# Patient Record
Sex: FEMALE | Race: WHITE | NOT HISPANIC OR LATINO | URBAN - METROPOLITAN AREA
[De-identification: names, ages, dates, MRNs, and addresses within clinical notes are randomized per-mention and may not be internally consistent; named-entity substitution may affect disease eponyms.]

---

## 2017-02-14 ENCOUNTER — EMERGENCY (EMERGENCY)
Facility: HOSPITAL | Age: 27
LOS: 1 days | End: 2017-02-14
Admitting: EMERGENCY MEDICINE
Payer: MEDICAID

## 2017-02-14 PROCEDURE — 99283 EMERGENCY DEPT VISIT LOW MDM: CPT | Mod: 25

## 2017-02-14 PROCEDURE — 71046 X-RAY EXAM CHEST 2 VIEWS: CPT

## 2017-02-14 PROCEDURE — 71020: CPT | Mod: 26

## 2017-02-14 PROCEDURE — 93005 ELECTROCARDIOGRAM TRACING: CPT

## 2017-02-14 PROCEDURE — 93010 ELECTROCARDIOGRAM REPORT: CPT

## 2017-10-05 ENCOUNTER — TRANSCRIPTION ENCOUNTER (OUTPATIENT)
Age: 27
End: 2017-10-05

## 2017-10-06 PROBLEM — Z00.00 ENCOUNTER FOR PREVENTIVE HEALTH EXAMINATION: Status: ACTIVE | Noted: 2017-10-06

## 2017-10-10 ENCOUNTER — EMERGENCY (EMERGENCY)
Facility: HOSPITAL | Age: 27
LOS: 0 days | Discharge: HOME | End: 2017-10-10
Attending: EMERGENCY MEDICINE | Admitting: EMERGENCY MEDICINE
Payer: MEDICAID

## 2017-10-10 VITALS
RESPIRATION RATE: 16 BRPM | HEIGHT: 62 IN | SYSTOLIC BLOOD PRESSURE: 118 MMHG | WEIGHT: 139.99 LBS | DIASTOLIC BLOOD PRESSURE: 72 MMHG | HEART RATE: 78 BPM | OXYGEN SATURATION: 97 % | TEMPERATURE: 99 F

## 2017-10-10 VITALS
TEMPERATURE: 98 F | DIASTOLIC BLOOD PRESSURE: 67 MMHG | OXYGEN SATURATION: 100 % | SYSTOLIC BLOOD PRESSURE: 101 MMHG | RESPIRATION RATE: 15 BRPM | HEART RATE: 78 BPM

## 2017-10-10 DIAGNOSIS — F10.120 ALCOHOL ABUSE WITH INTOXICATION, UNCOMPLICATED: ICD-10-CM
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
## 2017-10-10 LAB
ALBUMIN SERPL ELPH-MCNC: 4.1 G/DL — SIGNIFICANT CHANGE UP (ref 3.3–5)
ALP SERPL-CCNC: 98 U/L — SIGNIFICANT CHANGE UP (ref 40–120)
ALT FLD-CCNC: 27 U/L — SIGNIFICANT CHANGE UP (ref 12–78)
ANION GAP SERPL CALC-SCNC: 7 MMOL/L — SIGNIFICANT CHANGE UP (ref 5–17)
AST SERPL-CCNC: 21 U/L — SIGNIFICANT CHANGE UP (ref 15–37)
BASOPHILS # BLD AUTO: 0.1 K/UL — SIGNIFICANT CHANGE UP (ref 0–0.2)
BASOPHILS NFR BLD AUTO: 0.5 % — SIGNIFICANT CHANGE UP (ref 0–2)
BILIRUB SERPL-MCNC: 0.3 MG/DL — SIGNIFICANT CHANGE UP (ref 0.2–1.2)
BUN SERPL-MCNC: 6 MG/DL — LOW (ref 7–23)
CALCIUM SERPL-MCNC: 8.7 MG/DL — SIGNIFICANT CHANGE UP (ref 8.5–10.1)
CHLORIDE SERPL-SCNC: 109 MMOL/L — HIGH (ref 96–108)
CO2 SERPL-SCNC: 26 MMOL/L — SIGNIFICANT CHANGE UP (ref 22–31)
CREAT SERPL-MCNC: 0.58 MG/DL — SIGNIFICANT CHANGE UP (ref 0.5–1.3)
EOSINOPHIL # BLD AUTO: 0.1 K/UL — SIGNIFICANT CHANGE UP (ref 0–0.5)
EOSINOPHIL NFR BLD AUTO: 1.3 % — SIGNIFICANT CHANGE UP (ref 0–6)
ETHANOL SERPL-MCNC: 141 MG/DL — HIGH (ref 0–10)
GLUCOSE SERPL-MCNC: 84 MG/DL — SIGNIFICANT CHANGE UP (ref 70–99)
HCT VFR BLD CALC: 47.4 % — HIGH (ref 34.5–45)
HGB BLD-MCNC: 15.1 G/DL — SIGNIFICANT CHANGE UP (ref 11.5–15.5)
LYMPHOCYTES # BLD AUTO: 1.2 K/UL — SIGNIFICANT CHANGE UP (ref 1–3.3)
LYMPHOCYTES # BLD AUTO: 12.4 % — LOW (ref 13–44)
MCHC RBC-ENTMCNC: 24.2 PG — LOW (ref 27–34)
MCHC RBC-ENTMCNC: 31.8 GM/DL — LOW (ref 32–36)
MCV RBC AUTO: 75.8 FL — LOW (ref 80–100)
MONOCYTES # BLD AUTO: 0.5 K/UL — SIGNIFICANT CHANGE UP (ref 0–0.9)
MONOCYTES NFR BLD AUTO: 4.8 % — SIGNIFICANT CHANGE UP (ref 2–14)
NEUTROPHILS # BLD AUTO: 8 K/UL — HIGH (ref 1.8–7.4)
NEUTROPHILS NFR BLD AUTO: 81 % — HIGH (ref 43–77)
PLATELET # BLD AUTO: 246 K/UL — SIGNIFICANT CHANGE UP (ref 150–400)
POTASSIUM SERPL-MCNC: 3.8 MMOL/L — SIGNIFICANT CHANGE UP (ref 3.5–5.3)
POTASSIUM SERPL-SCNC: 3.8 MMOL/L — SIGNIFICANT CHANGE UP (ref 3.5–5.3)
PROT SERPL-MCNC: 8.1 GM/DL — SIGNIFICANT CHANGE UP (ref 6–8.3)
RBC # BLD: 6.25 M/UL — HIGH (ref 3.8–5.2)
RBC # FLD: 12.5 % — SIGNIFICANT CHANGE UP (ref 10.3–14.5)
SODIUM SERPL-SCNC: 142 MMOL/L — SIGNIFICANT CHANGE UP (ref 135–145)
WBC # BLD: 9.9 K/UL — SIGNIFICANT CHANGE UP (ref 3.8–10.5)
WBC # FLD AUTO: 9.9 K/UL — SIGNIFICANT CHANGE UP (ref 3.8–10.5)

## 2017-10-10 PROCEDURE — 99284 EMERGENCY DEPT VISIT MOD MDM: CPT | Mod: 25

## 2017-10-10 RX ORDER — ONDANSETRON 8 MG/1
4 TABLET, FILM COATED ORAL ONCE
Qty: 0 | Refills: 0 | Status: COMPLETED | OUTPATIENT
Start: 2017-10-10 | End: 2017-10-10

## 2017-10-10 RX ORDER — FAMOTIDINE 10 MG/ML
20 INJECTION INTRAVENOUS ONCE
Qty: 0 | Refills: 0 | Status: COMPLETED | OUTPATIENT
Start: 2017-10-10 | End: 2017-10-10

## 2017-10-10 RX ORDER — SODIUM CHLORIDE 9 MG/ML
1000 INJECTION INTRAMUSCULAR; INTRAVENOUS; SUBCUTANEOUS ONCE
Qty: 0 | Refills: 0 | Status: COMPLETED | OUTPATIENT
Start: 2017-10-10 | End: 2017-10-10

## 2017-10-10 RX ADMIN — SODIUM CHLORIDE 1000 MILLILITER(S): 9 INJECTION INTRAMUSCULAR; INTRAVENOUS; SUBCUTANEOUS at 05:30

## 2017-10-10 RX ADMIN — FAMOTIDINE 20 MILLIGRAM(S): 10 INJECTION INTRAVENOUS at 05:30

## 2017-10-10 RX ADMIN — ONDANSETRON 4 MILLIGRAM(S): 8 TABLET, FILM COATED ORAL at 05:30

## 2017-10-10 NOTE — ED ADULT NURSE NOTE - OBJECTIVE STATEMENT
pt BIBA c/o seeing small tinge of blood in vomit. pt reports drinking alcohol earlier. no other complaints.

## 2017-10-10 NOTE — ED PROVIDER NOTE - OBJECTIVE STATEMENT
25 y/o female with no PMHx presents to the ED BIBEMS c/o alcohol intoxication. +vomiting, prompting ED visit. Pt states she drank 3-4 of carol.

## 2017-10-10 NOTE — ED ADULT TRIAGE NOTE - CHIEF COMPLAINT QUOTE
patient was drinking heavily tonight and reports that she vomited and "may have seen blood." Denies any pain. No significant med hx per patient. patient appears intoxicated. AOB.

## 2017-10-16 ENCOUNTER — APPOINTMENT (OUTPATIENT)
Dept: ORTHOPEDIC SURGERY | Facility: CLINIC | Age: 27
End: 2017-10-16

## 2018-07-27 ENCOUNTER — EMERGENCY (EMERGENCY)
Facility: HOSPITAL | Age: 28
LOS: 1 days | Discharge: HOME | End: 2018-07-27
Attending: INTERNAL MEDICINE
Payer: COMMERCIAL

## 2018-07-27 VITALS
RESPIRATION RATE: 18 BRPM | TEMPERATURE: 98 F | SYSTOLIC BLOOD PRESSURE: 110 MMHG | OXYGEN SATURATION: 98 % | DIASTOLIC BLOOD PRESSURE: 65 MMHG | HEART RATE: 85 BPM

## 2018-07-27 VITALS
HEIGHT: 62 IN | WEIGHT: 138.89 LBS | DIASTOLIC BLOOD PRESSURE: 74 MMHG | TEMPERATURE: 98 F | RESPIRATION RATE: 14 BRPM | OXYGEN SATURATION: 98 % | SYSTOLIC BLOOD PRESSURE: 115 MMHG | HEART RATE: 133 BPM

## 2018-07-27 PROCEDURE — 70486 CT MAXILLOFACIAL W/O DYE: CPT

## 2018-07-27 PROCEDURE — 70486 CT MAXILLOFACIAL W/O DYE: CPT | Mod: 26

## 2018-07-27 PROCEDURE — 99285 EMERGENCY DEPT VISIT HI MDM: CPT

## 2018-07-27 PROCEDURE — 99284 EMERGENCY DEPT VISIT MOD MDM: CPT | Mod: 25

## 2018-07-27 RX ORDER — IBUPROFEN 200 MG
600 TABLET ORAL ONCE
Qty: 0 | Refills: 0 | Status: COMPLETED | OUTPATIENT
Start: 2018-07-27 | End: 2018-07-27

## 2018-07-27 RX ADMIN — Medication 600 MILLIGRAM(S): at 06:27
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
## 2018-07-27 NOTE — ED PROVIDER NOTE - ENMT, MLM
Airway patent, Nasal mucosa clear. Mouth with normal mucosa. Throat has no vesicles, no oropharyngeal exudates and uvula is midline. left facial malar, nasal swelling and tender

## 2018-09-27 ENCOUNTER — EMERGENCY (EMERGENCY)
Facility: HOSPITAL | Age: 28
LOS: 1 days | Discharge: HOME | End: 2018-09-27
Attending: EMERGENCY MEDICINE | Admitting: EMERGENCY MEDICINE
Payer: SELF-PAY

## 2018-09-27 VITALS
RESPIRATION RATE: 14 BRPM | DIASTOLIC BLOOD PRESSURE: 65 MMHG | TEMPERATURE: 98 F | OXYGEN SATURATION: 99 % | SYSTOLIC BLOOD PRESSURE: 129 MMHG | HEART RATE: 75 BPM

## 2018-09-27 VITALS
RESPIRATION RATE: 16 BRPM | HEART RATE: 76 BPM | WEIGHT: 149.91 LBS | HEIGHT: 62 IN | SYSTOLIC BLOOD PRESSURE: 140 MMHG | DIASTOLIC BLOOD PRESSURE: 70 MMHG | TEMPERATURE: 98 F | OXYGEN SATURATION: 98 %

## 2018-09-27 LAB
ALBUMIN SERPL ELPH-MCNC: 3.7 G/DL — SIGNIFICANT CHANGE UP (ref 3.3–5)
ALP SERPL-CCNC: 191 U/L — HIGH (ref 30–120)
ALT FLD-CCNC: 109 U/L DA — HIGH (ref 10–60)
ANION GAP SERPL CALC-SCNC: 9 MMOL/L — SIGNIFICANT CHANGE UP (ref 5–17)
AST SERPL-CCNC: 102 U/L — HIGH (ref 10–40)
BASOPHILS # BLD AUTO: 0.03 K/UL — SIGNIFICANT CHANGE UP (ref 0–0.2)
BASOPHILS NFR BLD AUTO: 0.5 % — SIGNIFICANT CHANGE UP (ref 0–2)
BILIRUB SERPL-MCNC: 0.4 MG/DL — SIGNIFICANT CHANGE UP (ref 0.2–1.2)
BUN SERPL-MCNC: 9 MG/DL — SIGNIFICANT CHANGE UP (ref 7–23)
CALCIUM SERPL-MCNC: 8.8 MG/DL — SIGNIFICANT CHANGE UP (ref 8.4–10.5)
CHLORIDE SERPL-SCNC: 108 MMOL/L — SIGNIFICANT CHANGE UP (ref 96–108)
CO2 SERPL-SCNC: 25 MMOL/L — SIGNIFICANT CHANGE UP (ref 22–31)
CREAT SERPL-MCNC: 0.63 MG/DL — SIGNIFICANT CHANGE UP (ref 0.5–1.3)
D DIMER BLD IA.RAPID-MCNC: <150 NG/ML DDU — SIGNIFICANT CHANGE UP
EOSINOPHIL # BLD AUTO: 0.27 K/UL — SIGNIFICANT CHANGE UP (ref 0–0.5)
EOSINOPHIL NFR BLD AUTO: 4.8 % — SIGNIFICANT CHANGE UP (ref 0–6)
GLUCOSE SERPL-MCNC: 98 MG/DL — SIGNIFICANT CHANGE UP (ref 70–99)
HCT VFR BLD CALC: 48.8 % — HIGH (ref 34.5–45)
HGB BLD-MCNC: 15.6 G/DL — HIGH (ref 11.5–15.5)
IMM GRANULOCYTES NFR BLD AUTO: 0.5 % — SIGNIFICANT CHANGE UP (ref 0–1.5)
LIDOCAIN IGE QN: 201 U/L — SIGNIFICANT CHANGE UP (ref 73–393)
LYMPHOCYTES # BLD AUTO: 1.92 K/UL — SIGNIFICANT CHANGE UP (ref 1–3.3)
LYMPHOCYTES # BLD AUTO: 34.2 % — SIGNIFICANT CHANGE UP (ref 13–44)
MCHC RBC-ENTMCNC: 24.8 PG — LOW (ref 27–34)
MCHC RBC-ENTMCNC: 32 GM/DL — SIGNIFICANT CHANGE UP (ref 32–36)
MCV RBC AUTO: 77.7 FL — LOW (ref 80–100)
MONOCYTES # BLD AUTO: 0.7 K/UL — SIGNIFICANT CHANGE UP (ref 0–0.9)
MONOCYTES NFR BLD AUTO: 12.5 % — SIGNIFICANT CHANGE UP (ref 2–14)
NEUTROPHILS # BLD AUTO: 2.66 K/UL — SIGNIFICANT CHANGE UP (ref 1.8–7.4)
NEUTROPHILS NFR BLD AUTO: 47.5 % — SIGNIFICANT CHANGE UP (ref 43–77)
NRBC # BLD: 0 /100 WBCS — SIGNIFICANT CHANGE UP (ref 0–0)
PLATELET # BLD AUTO: 219 K/UL — SIGNIFICANT CHANGE UP (ref 150–400)
POTASSIUM SERPL-MCNC: 4.2 MMOL/L — SIGNIFICANT CHANGE UP (ref 3.5–5.3)
POTASSIUM SERPL-SCNC: 4.2 MMOL/L — SIGNIFICANT CHANGE UP (ref 3.5–5.3)
PROT SERPL-MCNC: 7.5 G/DL — SIGNIFICANT CHANGE UP (ref 6–8.3)
RBC # BLD: 6.28 M/UL — HIGH (ref 3.8–5.2)
RBC # FLD: 14.1 % — SIGNIFICANT CHANGE UP (ref 10.3–14.5)
SODIUM SERPL-SCNC: 142 MMOL/L — SIGNIFICANT CHANGE UP (ref 135–145)
WBC # BLD: 5.61 K/UL — SIGNIFICANT CHANGE UP (ref 3.8–10.5)
WBC # FLD AUTO: 5.61 K/UL — SIGNIFICANT CHANGE UP (ref 3.8–10.5)

## 2018-09-27 PROCEDURE — 76700 US EXAM ABDOM COMPLETE: CPT | Mod: 26

## 2018-09-27 PROCEDURE — 87389 HIV-1 AG W/HIV-1&-2 AB AG IA: CPT

## 2018-09-27 PROCEDURE — 71046 X-RAY EXAM CHEST 2 VIEWS: CPT

## 2018-09-27 PROCEDURE — 99284 EMERGENCY DEPT VISIT MOD MDM: CPT | Mod: 25

## 2018-09-27 PROCEDURE — 83690 ASSAY OF LIPASE: CPT

## 2018-09-27 PROCEDURE — 93005 ELECTROCARDIOGRAM TRACING: CPT

## 2018-09-27 PROCEDURE — 93010 ELECTROCARDIOGRAM REPORT: CPT

## 2018-09-27 PROCEDURE — 99284 EMERGENCY DEPT VISIT MOD MDM: CPT

## 2018-09-27 PROCEDURE — 71046 X-RAY EXAM CHEST 2 VIEWS: CPT | Mod: 26

## 2018-09-27 PROCEDURE — 36415 COLL VENOUS BLD VENIPUNCTURE: CPT

## 2018-09-27 PROCEDURE — 76700 US EXAM ABDOM COMPLETE: CPT

## 2018-09-27 PROCEDURE — 85379 FIBRIN DEGRADATION QUANT: CPT

## 2018-09-27 PROCEDURE — 80053 COMPREHEN METABOLIC PANEL: CPT

## 2018-09-27 PROCEDURE — 85027 COMPLETE CBC AUTOMATED: CPT

## 2018-09-27 RX ORDER — FAMOTIDINE 10 MG/ML
20 INJECTION INTRAVENOUS ONCE
Qty: 0 | Refills: 0 | Status: DISCONTINUED | OUTPATIENT
Start: 2018-09-27 | End: 2018-10-01

## 2018-09-27 RX ORDER — FAMOTIDINE 10 MG/ML
20 INJECTION INTRAVENOUS ONCE
Qty: 0 | Refills: 0 | Status: DISCONTINUED | OUTPATIENT
Start: 2018-09-27 | End: 2018-09-27

## 2018-09-27 NOTE — ED ADULT NURSE NOTE - NSIMPLEMENTINTERV_GEN_ALL_ED
Implemented All Universal Safety Interventions:  [place] to call system. Call bell, personal items and telephone within reach. Instruct patient to call for assistance. Room bathroom lighting operational. Non-slip footwear when patient is off stretcher. Physically safe environment: no spills, clutter or unnecessary equipment. Stretcher in lowest position, wheels locked, appropriate side rails in place.

## 2018-09-27 NOTE — ED PROVIDER NOTE - OBJECTIVE STATEMENT
pt c/o 45 min of substernal chest pressure this am. no fevers, chills, ha, d/n/v, sob, cough, abd pain, edema, calf pain, travel. pain nonexertional, non pleuritic  pmd - marito

## 2018-09-29 LAB — HIV 1+2 AB+HIV1 P24 AG SERPL QL IA: SIGNIFICANT CHANGE UP

## 2019-02-27 ENCOUNTER — TRANSCRIPTION ENCOUNTER (OUTPATIENT)
Age: 29
End: 2019-02-27

## 2019-03-01 ENCOUNTER — OUTPATIENT (OUTPATIENT)
Dept: OUTPATIENT SERVICES | Facility: HOSPITAL | Age: 29
LOS: 1 days | End: 2019-03-01
Payer: MEDICAID

## 2019-03-01 PROCEDURE — G9001: CPT

## 2019-03-14 DIAGNOSIS — Z71.89 OTHER SPECIFIED COUNSELING: ICD-10-CM

## 2019-03-21 ENCOUNTER — EMERGENCY (EMERGENCY)
Facility: HOSPITAL | Age: 29
LOS: 1 days | Discharge: HOME | End: 2019-03-21
Attending: EMERGENCY MEDICINE | Admitting: EMERGENCY MEDICINE
Payer: MEDICAID

## 2019-03-21 VITALS
SYSTOLIC BLOOD PRESSURE: 107 MMHG | TEMPERATURE: 98 F | RESPIRATION RATE: 16 BRPM | DIASTOLIC BLOOD PRESSURE: 74 MMHG | HEART RATE: 88 BPM | OXYGEN SATURATION: 97 %

## 2019-03-21 VITALS
OXYGEN SATURATION: 98 % | SYSTOLIC BLOOD PRESSURE: 130 MMHG | HEART RATE: 118 BPM | RESPIRATION RATE: 20 BRPM | TEMPERATURE: 98 F | WEIGHT: 149.91 LBS | DIASTOLIC BLOOD PRESSURE: 76 MMHG | HEIGHT: 62 IN

## 2019-03-21 LAB
ALBUMIN SERPL ELPH-MCNC: 3.8 G/DL — SIGNIFICANT CHANGE UP (ref 3.3–5)
ALP SERPL-CCNC: 145 U/L — HIGH (ref 30–120)
ALT FLD-CCNC: 68 U/L DA — HIGH (ref 10–60)
ANION GAP SERPL CALC-SCNC: 9 MMOL/L — SIGNIFICANT CHANGE UP (ref 5–17)
APPEARANCE UR: CLEAR — SIGNIFICANT CHANGE UP
APTT BLD: 33.6 SEC — SIGNIFICANT CHANGE UP (ref 28.5–37)
AST SERPL-CCNC: 30 U/L — SIGNIFICANT CHANGE UP (ref 10–40)
BASOPHILS # BLD AUTO: 0.02 K/UL — SIGNIFICANT CHANGE UP (ref 0–0.2)
BASOPHILS NFR BLD AUTO: 0.4 % — SIGNIFICANT CHANGE UP (ref 0–2)
BILIRUB SERPL-MCNC: 0.3 MG/DL — SIGNIFICANT CHANGE UP (ref 0.2–1.2)
BILIRUB UR-MCNC: NEGATIVE — SIGNIFICANT CHANGE UP
BUN SERPL-MCNC: 8 MG/DL — SIGNIFICANT CHANGE UP (ref 7–23)
CALCIUM SERPL-MCNC: 9.2 MG/DL — SIGNIFICANT CHANGE UP (ref 8.4–10.5)
CHLORIDE SERPL-SCNC: 103 MMOL/L — SIGNIFICANT CHANGE UP (ref 96–108)
CO2 SERPL-SCNC: 28 MMOL/L — SIGNIFICANT CHANGE UP (ref 22–31)
COLOR SPEC: YELLOW — SIGNIFICANT CHANGE UP
CREAT SERPL-MCNC: 0.84 MG/DL — SIGNIFICANT CHANGE UP (ref 0.5–1.3)
D DIMER BLD IA.RAPID-MCNC: <150 NG/ML DDU — SIGNIFICANT CHANGE UP
DIFF PNL FLD: ABNORMAL
EOSINOPHIL # BLD AUTO: 0.11 K/UL — SIGNIFICANT CHANGE UP (ref 0–0.5)
EOSINOPHIL NFR BLD AUTO: 2.4 % — SIGNIFICANT CHANGE UP (ref 0–6)
FLU A RESULT: SIGNIFICANT CHANGE UP
FLU A RESULT: SIGNIFICANT CHANGE UP
FLUAV AG NPH QL: SIGNIFICANT CHANGE UP
FLUBV AG NPH QL: SIGNIFICANT CHANGE UP
GLUCOSE SERPL-MCNC: 127 MG/DL — HIGH (ref 70–99)
GLUCOSE UR QL: NEGATIVE MG/DL — SIGNIFICANT CHANGE UP
HCT VFR BLD CALC: 44 % — SIGNIFICANT CHANGE UP (ref 34.5–45)
HGB BLD-MCNC: 14.1 G/DL — SIGNIFICANT CHANGE UP (ref 11.5–15.5)
IMM GRANULOCYTES NFR BLD AUTO: 0.2 % — SIGNIFICANT CHANGE UP (ref 0–1.5)
INR BLD: 1.11 RATIO — SIGNIFICANT CHANGE UP (ref 0.88–1.16)
KETONES UR-MCNC: NEGATIVE — SIGNIFICANT CHANGE UP
LACTATE SERPL-SCNC: 1.7 MMOL/L — SIGNIFICANT CHANGE UP (ref 0.7–2)
LEUKOCYTE ESTERASE UR-ACNC: ABNORMAL
LYMPHOCYTES # BLD AUTO: 1.29 K/UL — SIGNIFICANT CHANGE UP (ref 1–3.3)
LYMPHOCYTES # BLD AUTO: 28 % — SIGNIFICANT CHANGE UP (ref 13–44)
MCHC RBC-ENTMCNC: 24.8 PG — LOW (ref 27–34)
MCHC RBC-ENTMCNC: 32 GM/DL — SIGNIFICANT CHANGE UP (ref 32–36)
MCV RBC AUTO: 77.5 FL — LOW (ref 80–100)
MONOCYTES # BLD AUTO: 0.59 K/UL — SIGNIFICANT CHANGE UP (ref 0–0.9)
MONOCYTES NFR BLD AUTO: 12.8 % — SIGNIFICANT CHANGE UP (ref 2–14)
NEUTROPHILS # BLD AUTO: 2.59 K/UL — SIGNIFICANT CHANGE UP (ref 1.8–7.4)
NEUTROPHILS NFR BLD AUTO: 56.2 % — SIGNIFICANT CHANGE UP (ref 43–77)
NITRITE UR-MCNC: NEGATIVE — SIGNIFICANT CHANGE UP
NRBC # BLD: 0 /100 WBCS — SIGNIFICANT CHANGE UP (ref 0–0)
PH UR: 8 — SIGNIFICANT CHANGE UP (ref 5–8)
PLATELET # BLD AUTO: 189 K/UL — SIGNIFICANT CHANGE UP (ref 150–400)
POTASSIUM SERPL-MCNC: 3.9 MMOL/L — SIGNIFICANT CHANGE UP (ref 3.5–5.3)
POTASSIUM SERPL-SCNC: 3.9 MMOL/L — SIGNIFICANT CHANGE UP (ref 3.5–5.3)
PROT SERPL-MCNC: 7.7 G/DL — SIGNIFICANT CHANGE UP (ref 6–8.3)
PROT UR-MCNC: 15 MG/DL
PROTHROM AB SERPL-ACNC: 12.1 SEC — SIGNIFICANT CHANGE UP (ref 10–12.9)
RBC # BLD: 5.68 M/UL — HIGH (ref 3.8–5.2)
RBC # FLD: 12.8 % — SIGNIFICANT CHANGE UP (ref 10.3–14.5)
RSV RESULT: SIGNIFICANT CHANGE UP
RSV RNA RESP QL NAA+PROBE: SIGNIFICANT CHANGE UP
SODIUM SERPL-SCNC: 140 MMOL/L — SIGNIFICANT CHANGE UP (ref 135–145)
SP GR SPEC: 1.01 — SIGNIFICANT CHANGE UP (ref 1.01–1.02)
UROBILINOGEN FLD QL: NEGATIVE MG/DL — SIGNIFICANT CHANGE UP
WBC # BLD: 4.61 K/UL — SIGNIFICANT CHANGE UP (ref 3.8–10.5)
WBC # FLD AUTO: 4.61 K/UL — SIGNIFICANT CHANGE UP (ref 3.8–10.5)

## 2019-03-21 PROCEDURE — 87086 URINE CULTURE/COLONY COUNT: CPT

## 2019-03-21 PROCEDURE — 93005 ELECTROCARDIOGRAM TRACING: CPT

## 2019-03-21 PROCEDURE — 80053 COMPREHEN METABOLIC PANEL: CPT

## 2019-03-21 PROCEDURE — 85379 FIBRIN DEGRADATION QUANT: CPT

## 2019-03-21 PROCEDURE — 85610 PROTHROMBIN TIME: CPT

## 2019-03-21 PROCEDURE — 93971 EXTREMITY STUDY: CPT | Mod: 26,RT

## 2019-03-21 PROCEDURE — 93010 ELECTROCARDIOGRAM REPORT: CPT

## 2019-03-21 PROCEDURE — 87631 RESP VIRUS 3-5 TARGETS: CPT

## 2019-03-21 PROCEDURE — 99284 EMERGENCY DEPT VISIT MOD MDM: CPT

## 2019-03-21 PROCEDURE — 93971 EXTREMITY STUDY: CPT

## 2019-03-21 PROCEDURE — 87040 BLOOD CULTURE FOR BACTERIA: CPT

## 2019-03-21 PROCEDURE — 36415 COLL VENOUS BLD VENIPUNCTURE: CPT

## 2019-03-21 PROCEDURE — 85730 THROMBOPLASTIN TIME PARTIAL: CPT

## 2019-03-21 PROCEDURE — 99284 EMERGENCY DEPT VISIT MOD MDM: CPT | Mod: 25

## 2019-03-21 PROCEDURE — 81001 URINALYSIS AUTO W/SCOPE: CPT

## 2019-03-21 PROCEDURE — 96361 HYDRATE IV INFUSION ADD-ON: CPT

## 2019-03-21 PROCEDURE — 96374 THER/PROPH/DIAG INJ IV PUSH: CPT

## 2019-03-21 PROCEDURE — 85027 COMPLETE CBC AUTOMATED: CPT

## 2019-03-21 PROCEDURE — 83605 ASSAY OF LACTIC ACID: CPT

## 2019-03-21 PROCEDURE — 71046 X-RAY EXAM CHEST 2 VIEWS: CPT

## 2019-03-21 PROCEDURE — 71046 X-RAY EXAM CHEST 2 VIEWS: CPT | Mod: 26

## 2019-03-21 RX ORDER — ACETAMINOPHEN 500 MG
650 TABLET ORAL ONCE
Qty: 0 | Refills: 0 | Status: COMPLETED | OUTPATIENT
Start: 2019-03-21 | End: 2019-03-21

## 2019-03-21 RX ORDER — KETOROLAC TROMETHAMINE 30 MG/ML
30 SYRINGE (ML) INJECTION ONCE
Qty: 0 | Refills: 0 | Status: DISCONTINUED | OUTPATIENT
Start: 2019-03-21 | End: 2019-03-21

## 2019-03-21 RX ORDER — SODIUM CHLORIDE 9 MG/ML
1000 INJECTION INTRAMUSCULAR; INTRAVENOUS; SUBCUTANEOUS ONCE
Qty: 0 | Refills: 0 | Status: COMPLETED | OUTPATIENT
Start: 2019-03-21 | End: 2019-03-21

## 2019-03-21 RX ADMIN — Medication 30 MILLIGRAM(S): at 16:30

## 2019-03-21 RX ADMIN — SODIUM CHLORIDE 1000 MILLILITER(S): 9 INJECTION INTRAMUSCULAR; INTRAVENOUS; SUBCUTANEOUS at 16:57

## 2019-03-21 RX ADMIN — Medication 30 MILLIGRAM(S): at 16:57

## 2019-03-21 RX ADMIN — Medication 650 MILLIGRAM(S): at 16:00

## 2019-03-21 RX ADMIN — Medication 650 MILLIGRAM(S): at 15:10

## 2019-03-21 RX ADMIN — SODIUM CHLORIDE 1000 MILLILITER(S): 9 INJECTION INTRAMUSCULAR; INTRAVENOUS; SUBCUTANEOUS at 15:10

## 2019-03-21 NOTE — ED ADULT NURSE NOTE - OBJECTIVE STATEMENT
Patient presents c/o fever x 2 days with cough. Patient recently started a new job in a  and feels she has been exposed to germs at work. Last night patient developed right upper chest pains radiating to her right shoulder and feels short of breath. Patient also c/o pain to right leg, knee, ankle and calf since 2 days ago. Patient presents c/o fever x 2 days as high as 102-103 with cough. Patient recently started a new job in a  and feels she has been exposed to germs at work. Last night patient developed right upper chest pains radiating to her right shoulder and feels short of breath. Patient also c/o pain to right leg, knee, ankle and calf since 2 days ago.

## 2019-03-21 NOTE — ED PROVIDER NOTE - PROGRESS NOTE DETAILS
Alex MAHARAJ for ED attending, Dr. Keller : 27 y/o female with fever, cough, chest pain, flu like sx since yesterday, works at [employer], around sick children, smokes cigarettes, denies pregnancy.  PE: afebrile, no distress, lungs clear, remainder unremarkable.  Plan for labs with flu swab, CXR, EKG. Reevaluated patient at bedside.  Patient feeling improved.  no resp distress. lungs clear. Discussed the results of all diagnostic testing in ED and copies of all reports given.   An opportunity to ask questions was given.  Discussed the importance of prompt, close medical follow-up.  Patient will return with any changes, concerns or persistent / worsening symptoms.  Understanding of all instructions verbalized.  recommend motrin over the counter, push fluids, flonase nasal spray and decongestant. will have close follow up with PCP

## 2019-03-21 NOTE — ED PROVIDER NOTE - CLINICAL SUMMARY MEDICAL DECISION MAKING FREE TEXT BOX
cough and fever 2 days. chest pain and BRIAN since last night. no distress on exam. mildly tachycardic. will order blood cultures, lactate, CXR, EKG, UA, d-dimer, venous doppler. will give IVF and oral tylenol.

## 2019-03-21 NOTE — ED PROVIDER NOTE - ENMT, MLM
Airway patent, no sinus tenderness to palpation. Mouth with normal mucosa. Throat has no vesicles, no oropharyngeal exudates and uvula is midline.

## 2019-03-21 NOTE — ED ADULT NURSE NOTE - NSIMPLEMENTINTERV_GEN_ALL_ED
Implemented All Universal Safety Interventions:  [place] to call system. Call bell, personal items and telephone within reach. Instruct patient to call for assistance. Room bathroom lighting operational. Non-slip footwear when patient is off stretcher. Physically safe environment: no spills, clutter or unnecessary equipment. Stretcher in lowest position, wheels locked, appropriate side rails in place.

## 2019-03-21 NOTE — ED PROVIDER NOTE - OBJECTIVE STATEMENT
otherwise healthy 28 year old female presents with cough, congestion, and fever that started 2 days ago. recently started working at [employer] last week and thought may have gotten sick from one of the kids. right sided chest pain and shortness of breath started last night. reports fever of 102-103 two nights ago. also reports subjective fever this am. has not taken anything for pain or symptoms. slight discomfort to right knee and calf. denies recent travels or prolonged immobilizations. denies history of previous DVT or PE. no history of cancer  PCP Radha Sears

## 2019-03-21 NOTE — ED PROVIDER NOTE - LAB INTERPRETATION
FLU A B RSV Detection by PCR (03.21.19 @ 15:26)    Flu A Result: NotDetec: The Flu A B RSV assay is a Real-Time PCR test for the qualitative  detection and differentiation of Influenza A, Influenza B, and  Respiratory Syncytial Virus on nasopharyngeal swabs. The results should  be interpreted in the context of all clinical and laboratory findings.    Flu B Result: NotDetec    RSV Result: NotDetec

## 2019-03-22 LAB
CULTURE RESULTS: SIGNIFICANT CHANGE UP
SPECIMEN SOURCE: SIGNIFICANT CHANGE UP

## 2019-03-26 LAB
CULTURE RESULTS: SIGNIFICANT CHANGE UP
CULTURE RESULTS: SIGNIFICANT CHANGE UP
SPECIMEN SOURCE: SIGNIFICANT CHANGE UP
SPECIMEN SOURCE: SIGNIFICANT CHANGE UP

## 2019-04-11 ENCOUNTER — RESULT REVIEW (OUTPATIENT)
Age: 29
End: 2019-04-11

## 2021-03-18 ENCOUNTER — RESULT REVIEW (OUTPATIENT)
Age: 31
End: 2021-03-18

## 2021-12-22 ENCOUNTER — EMERGENCY (EMERGENCY)
Facility: HOSPITAL | Age: 31
LOS: 1 days | Discharge: HOME | End: 2021-12-22
Attending: EMERGENCY MEDICINE | Admitting: EMERGENCY MEDICINE
Payer: MEDICAID

## 2021-12-22 VITALS
SYSTOLIC BLOOD PRESSURE: 130 MMHG | DIASTOLIC BLOOD PRESSURE: 82 MMHG | OXYGEN SATURATION: 100 % | HEIGHT: 62 IN | HEART RATE: 82 BPM | RESPIRATION RATE: 14 BRPM | TEMPERATURE: 98 F | WEIGHT: 166.89 LBS

## 2021-12-22 LAB — SARS-COV-2 RNA SPEC QL NAA+PROBE: DETECTED

## 2021-12-22 PROCEDURE — 87635 SARS-COV-2 COVID-19 AMP PRB: CPT

## 2021-12-22 PROCEDURE — 99283 EMERGENCY DEPT VISIT LOW MDM: CPT

## 2021-12-22 PROCEDURE — 99284 EMERGENCY DEPT VISIT MOD MDM: CPT

## 2021-12-22 RX ORDER — ACETAMINOPHEN 500 MG
650 TABLET ORAL ONCE
Refills: 0 | Status: COMPLETED | OUTPATIENT
Start: 2021-12-22 | End: 2021-12-22

## 2021-12-22 RX ADMIN — Medication 650 MILLIGRAM(S): at 18:30

## 2021-12-22 NOTE — ED PROVIDER NOTE - OBJECTIVE STATEMENT
31 y/ F w/ no pertinent PMHx presents to ED c/o 2 days of body aches, and mild SOB. Pt reports 17 co-worker's have tested positive for COVID over the past week. Pt declining XR only wants swab and Tylenol for her symptoms. Pt relates she has cough but it is the same as chronic smokers cough. PMD: none. 31 y/ F w/ no pertinent PMHx presents to ED c/o 2 days of body aches, and mild SOB. Pt reports 17 co-worker's have tested positive for COVID over the past week. Pt declining XR only wants swab and Tylenol for her symptoms. Pt relates she has cough but it is the same as chronic smokers cough. no fevers, chills, abd pain, cp, d/n/v, diarrhea, urinary complaints. PMD: none. 31 y/ F w/ no pertinent PMHx presents to ED c/o 2 days of body aches, and mild SOB. Pt reports 17 co-worker's have tested positive for COVID over the past week. Pt declining XR only wants swab and Tylenol for her symptoms. Pt relates she has cough but it is the same as chronic smokers cough. + loss of taste. no fevers, chills, abd pain, cp, d/n/v, diarrhea, urinary complaints. PMD: none. 31 y/ F w/ no pertinent PMHx presents to ED c/o 2 days of body aches, and mild SOB. Pt reports 17 co-worker's have tested positive for COVID over the past week. Pt declining labs or XR only wants swab and Tylenol for her symptoms. Pt relates she has cough but it is the same as chronic smokers cough. + loss of taste. no fevers, chills, abd pain, cp, d/n/v, diarrhea, urinary complaints. PMD: none.

## 2021-12-22 NOTE — ED PROVIDER NOTE - CARE PROVIDER_API CALL
Rachel Noyola)  Internal Medicine  [address] [place] [address]  Phone: [phone]  Fax: [phone]  Follow Up Time: 1-3 Days

## 2021-12-22 NOTE — ED PROVIDER NOTE - PATIENT PORTAL LINK FT
You can access the FollowMyHealth Patient Portal offered by [facility] by registering at the following website: [email or web address]. By joining [employer]’s FollowMyHealth portal, you will also be able to view your health information using other applications (apps) compatible with our system. No. JOSH screening performed.  STOP BANG Legend: 0-2 = LOW Risk; 3-4 = INTERMEDIATE Risk; 5-8 = HIGH Risk

## 2022-03-29 NOTE — ED ADULT TRIAGE NOTE - CADM TRG TX PRIOR TO ARRIVAL
Appt scheduled for May 6 start time 9:30 AM, left all information on voicemail, mailed appt slip to address on file.     ----- Message from Yovana Alonzo RN sent at 3/28/2022  4:17 PM CDT -----  Contact: [phone]    ----- Message -----  From: Shelby Wright  Sent: 3/28/2022   2:49 PM CDT  To: Jai CALI Staff      Patient is returning a phone call.- Mom-    Who left a message for the patient:-  Yovana Alonzo RN-      Does patient know what this is regarding:- Scheduling a appointment-    Would you like a call back, or a response through your [facility] portal?:- Call back-    Comments:  Please call mom back to advise.         none

## 2022-06-22 ENCOUNTER — EMERGENCY (EMERGENCY)
Facility: HOSPITAL | Age: 32
LOS: 1 days | Discharge: HOME | End: 2022-06-22
Attending: EMERGENCY MEDICINE | Admitting: EMERGENCY MEDICINE
Payer: MEDICAID

## 2022-06-22 VITALS
DIASTOLIC BLOOD PRESSURE: 64 MMHG | HEART RATE: 87 BPM | TEMPERATURE: 98 F | RESPIRATION RATE: 19 BRPM | SYSTOLIC BLOOD PRESSURE: 130 MMHG | OXYGEN SATURATION: 100 %

## 2022-06-22 VITALS
OXYGEN SATURATION: 97 % | HEIGHT: 62 IN | TEMPERATURE: 98 F | DIASTOLIC BLOOD PRESSURE: 89 MMHG | SYSTOLIC BLOOD PRESSURE: 130 MMHG | WEIGHT: 171.96 LBS | HEART RATE: 85 BPM | RESPIRATION RATE: 24 BRPM

## 2022-06-22 LAB
ALBUMIN SERPL ELPH-MCNC: 4.2 G/DL — SIGNIFICANT CHANGE UP (ref 3.3–5)
ALP SERPL-CCNC: 152 U/L — HIGH (ref 30–120)
ALT FLD-CCNC: 76 U/L DA — HIGH (ref 10–60)
ANION GAP SERPL CALC-SCNC: 7 MMOL/L — SIGNIFICANT CHANGE UP (ref 5–17)
AST SERPL-CCNC: 50 U/L — HIGH (ref 10–40)
BASOPHILS # BLD AUTO: 0.02 K/UL — SIGNIFICANT CHANGE UP (ref 0–0.2)
BASOPHILS NFR BLD AUTO: 0.5 % — SIGNIFICANT CHANGE UP (ref 0–2)
BILIRUB SERPL-MCNC: 0.3 MG/DL — SIGNIFICANT CHANGE UP (ref 0.2–1.2)
BUN SERPL-MCNC: 5 MG/DL — LOW (ref 7–23)
CALCIUM SERPL-MCNC: 9.4 MG/DL — SIGNIFICANT CHANGE UP (ref 8.4–10.5)
CHLORIDE SERPL-SCNC: 101 MMOL/L — SIGNIFICANT CHANGE UP (ref 96–108)
CO2 SERPL-SCNC: 28 MMOL/L — SIGNIFICANT CHANGE UP (ref 22–31)
CREAT SERPL-MCNC: 0.59 MG/DL — SIGNIFICANT CHANGE UP (ref 0.5–1.3)
D DIMER BLD IA.RAPID-MCNC: <150 NG/ML DDU — SIGNIFICANT CHANGE UP
EGFR: 125 ML/MIN/1.73M2 — SIGNIFICANT CHANGE UP
EOSINOPHIL # BLD AUTO: 0.17 K/UL — SIGNIFICANT CHANGE UP (ref 0–0.5)
EOSINOPHIL NFR BLD AUTO: 4.1 % — SIGNIFICANT CHANGE UP (ref 0–6)
FLUAV AG NPH QL: SIGNIFICANT CHANGE UP
FLUBV AG NPH QL: SIGNIFICANT CHANGE UP
GLUCOSE SERPL-MCNC: 98 MG/DL — SIGNIFICANT CHANGE UP (ref 70–99)
HCG SERPL-ACNC: 1 MIU/ML — SIGNIFICANT CHANGE UP
HCT VFR BLD CALC: 42 % — SIGNIFICANT CHANGE UP (ref 34.5–45)
HGB BLD-MCNC: 13.5 G/DL — SIGNIFICANT CHANGE UP (ref 11.5–15.5)
IMM GRANULOCYTES NFR BLD AUTO: 0.2 % — SIGNIFICANT CHANGE UP (ref 0–1.5)
LIDOCAIN IGE QN: 96 U/L — SIGNIFICANT CHANGE UP (ref 73–393)
LYMPHOCYTES # BLD AUTO: 1.57 K/UL — SIGNIFICANT CHANGE UP (ref 1–3.3)
LYMPHOCYTES # BLD AUTO: 38 % — SIGNIFICANT CHANGE UP (ref 13–44)
MAGNESIUM SERPL-MCNC: 2.1 MG/DL — SIGNIFICANT CHANGE UP (ref 1.6–2.6)
MCHC RBC-ENTMCNC: 24.3 PG — LOW (ref 27–34)
MCHC RBC-ENTMCNC: 32.1 GM/DL — SIGNIFICANT CHANGE UP (ref 32–36)
MCV RBC AUTO: 75.7 FL — LOW (ref 80–100)
MONOCYTES # BLD AUTO: 0.42 K/UL — SIGNIFICANT CHANGE UP (ref 0–0.9)
MONOCYTES NFR BLD AUTO: 10.2 % — SIGNIFICANT CHANGE UP (ref 2–14)
NEUTROPHILS # BLD AUTO: 1.94 K/UL — SIGNIFICANT CHANGE UP (ref 1.8–7.4)
NEUTROPHILS NFR BLD AUTO: 47 % — SIGNIFICANT CHANGE UP (ref 43–77)
NRBC # BLD: 0 /100 WBCS — SIGNIFICANT CHANGE UP (ref 0–0)
PLATELET # BLD AUTO: 222 K/UL — SIGNIFICANT CHANGE UP (ref 150–400)
POTASSIUM SERPL-MCNC: 3.7 MMOL/L — SIGNIFICANT CHANGE UP (ref 3.5–5.3)
POTASSIUM SERPL-SCNC: 3.7 MMOL/L — SIGNIFICANT CHANGE UP (ref 3.5–5.3)
PROT SERPL-MCNC: 8.4 G/DL — HIGH (ref 6–8.3)
RBC # BLD: 5.55 M/UL — HIGH (ref 3.8–5.2)
RBC # FLD: 13.1 % — SIGNIFICANT CHANGE UP (ref 10.3–14.5)
RSV RNA NPH QL NAA+NON-PROBE: SIGNIFICANT CHANGE UP
SARS-COV-2 RNA SPEC QL NAA+PROBE: SIGNIFICANT CHANGE UP
SODIUM SERPL-SCNC: 136 MMOL/L — SIGNIFICANT CHANGE UP (ref 135–145)
TROPONIN I, HIGH SENSITIVITY RESULT: <4 NG/L — SIGNIFICANT CHANGE UP
WBC # BLD: 4.13 K/UL — SIGNIFICANT CHANGE UP (ref 3.8–10.5)
WBC # FLD AUTO: 4.13 K/UL — SIGNIFICANT CHANGE UP (ref 3.8–10.5)

## 2022-06-22 PROCEDURE — 85379 FIBRIN DEGRADATION QUANT: CPT

## 2022-06-22 PROCEDURE — 80053 COMPREHEN METABOLIC PANEL: CPT

## 2022-06-22 PROCEDURE — 84702 CHORIONIC GONADOTROPIN TEST: CPT

## 2022-06-22 PROCEDURE — 87637 SARSCOV2&INF A&B&RSV AMP PRB: CPT

## 2022-06-22 PROCEDURE — 99285 EMERGENCY DEPT VISIT HI MDM: CPT | Mod: 25

## 2022-06-22 PROCEDURE — 99285 EMERGENCY DEPT VISIT HI MDM: CPT

## 2022-06-22 PROCEDURE — 36415 COLL VENOUS BLD VENIPUNCTURE: CPT

## 2022-06-22 PROCEDURE — 70450 CT HEAD/BRAIN W/O DYE: CPT | Mod: 26,MA

## 2022-06-22 PROCEDURE — 93005 ELECTROCARDIOGRAM TRACING: CPT

## 2022-06-22 PROCEDURE — 71046 X-RAY EXAM CHEST 2 VIEWS: CPT

## 2022-06-22 PROCEDURE — 83690 ASSAY OF LIPASE: CPT

## 2022-06-22 PROCEDURE — 70450 CT HEAD/BRAIN W/O DYE: CPT | Mod: MA

## 2022-06-22 PROCEDURE — 84484 ASSAY OF TROPONIN QUANT: CPT

## 2022-06-22 PROCEDURE — 85025 COMPLETE CBC W/AUTO DIFF WBC: CPT

## 2022-06-22 PROCEDURE — 82962 GLUCOSE BLOOD TEST: CPT

## 2022-06-22 PROCEDURE — 93010 ELECTROCARDIOGRAM REPORT: CPT

## 2022-06-22 PROCEDURE — 71046 X-RAY EXAM CHEST 2 VIEWS: CPT | Mod: 26

## 2022-06-22 PROCEDURE — 83735 ASSAY OF MAGNESIUM: CPT

## 2022-06-22 PROCEDURE — 96360 HYDRATION IV INFUSION INIT: CPT

## 2022-06-22 RX ORDER — MECLIZINE HCL 12.5 MG
1 TABLET ORAL
Qty: 12 | Refills: 0
Start: 2022-06-22 | End: 2022-06-25

## 2022-06-22 RX ORDER — SODIUM CHLORIDE 9 MG/ML
1000 INJECTION INTRAMUSCULAR; INTRAVENOUS; SUBCUTANEOUS ONCE
Refills: 0 | Status: COMPLETED | OUTPATIENT
Start: 2022-06-22 | End: 2022-06-22

## 2022-06-22 RX ORDER — ACETAMINOPHEN 500 MG
975 TABLET ORAL ONCE
Refills: 0 | Status: COMPLETED | OUTPATIENT
Start: 2022-06-22 | End: 2022-06-22

## 2022-06-22 RX ORDER — MECLIZINE HCL 12.5 MG
25 TABLET ORAL ONCE
Refills: 0 | Status: COMPLETED | OUTPATIENT
Start: 2022-06-22 | End: 2022-06-22

## 2022-06-22 RX ADMIN — Medication 25 MILLIGRAM(S): at 17:26

## 2022-06-22 RX ADMIN — SODIUM CHLORIDE 1000 MILLILITER(S): 9 INJECTION INTRAMUSCULAR; INTRAVENOUS; SUBCUTANEOUS at 16:03

## 2022-06-22 RX ADMIN — Medication 975 MILLIGRAM(S): at 17:26

## 2022-06-22 RX ADMIN — Medication 975 MILLIGRAM(S): at 18:15

## 2022-06-22 RX ADMIN — SODIUM CHLORIDE 1000 MILLILITER(S): 9 INJECTION INTRAMUSCULAR; INTRAVENOUS; SUBCUTANEOUS at 17:03

## 2022-06-22 NOTE — ED PROVIDER NOTE - CARDIOVASCULAR [+], MLM
CHEST PAIN Split-Thickness Skin Graft Text: The defect edges were debeveled with a #15 scalpel blade.  Given the location of the defect, shape of the defect and the proximity to free margins a split thickness skin graft was deemed most appropriate.  Using a sterile surgical marker, the primary defect shape was transferred to the donor site. The split thickness graft was then harvested.  The skin graft was then placed in the primary defect and oriented appropriately.

## 2022-06-22 NOTE — ED PROVIDER NOTE - CARE PROVIDER_API CALL
Donnell Elam  Neurology  [address], [address]  [place] [address]  Phone: [phone]  Fax: [phone]  Follow Up Time:

## 2022-06-22 NOTE — ED ADULT NURSE NOTE - OBJECTIVE STATEMENT
31 YOF A&OX3 with pmh of DM (on metformin) presents to ED for dizziness. pt states developed left sided numbness, nausea and vomiting for 1 hr. pt states recently had episodes of fever 3 days ago that subsided. pt placed on cardiac monitor and EKG completed in ED. pt also complains of chest discomfort rated 7/10. pt denies sob, n/v/d, headaches. safety maintained.

## 2022-06-22 NOTE — ED PROVIDER NOTE - PATIENT PORTAL LINK FT
You can access the FollowMyHealth Patient Portal offered by [facility] by registering at the following website: [email or web address]. By joining [employer]’s FollowMyHealth portal, you will also be able to view your health information using other applications (apps) compatible with our system.

## 2022-06-22 NOTE — ED PROVIDER NOTE - CLINICAL SUMMARY MEDICAL DECISION MAKING FREE TEXT BOX
32 y/o female recently diagnosed with diabetes by her OBGYN on Metformin presents to the ED c/o intermittent dizziness, nausea, and left arm numbness radiating down to toes x today while at work. Pt also reports fevers and cough x few days but tested negative for COVID-19 yesterday. No vomiting. no headache. No cp/sob/palp. No ting/focal weak. No sick contacts. No other acute co. Pt is vaccinated for COVID-19. 30 y/o female recently diagnosed with diabetes by her OBGYN on Metformin presents to the ED c/o intermittent dizziness - states describes as spinning sensation, worse with movements of head. some nausea today as well. Pt states had some tingling in her L arm and L leg today while at work. no numbness /tingling to face. no numbness / focal weakness. No fall / recent trauma. No HA. Pt also reports fevers and cough x few days but tested negative for COVID-19 yesterday. No vomiting. no headache. No cp/sob/palp. No ting/focal weak. No sick contacts. No other acute co. Pt is vaccinated for COVID-19.  exam: MM Moist. neck supple, no meningeal signs. cta bl , no w/r/r. no acc muscle use. abd soft NT. no hsm. no cvat. nl non-focal detailed neuro exam. Nl non-focal detailed neuro exam. no c/c/e. no other acute findings.  check labs, xr, dimer, CE, CT head, meds for new onset vertigo - outpt neuro

## 2022-06-22 NOTE — ED PROVIDER NOTE - OBJECTIVE STATEMENT
32 y/o F newly-diagnosed DM (~3 weeks ago by SHUKRI, on metformin) c/o intermittent dizziness, nausea, left sided numbness radiating down to fingers and toes, while at work today. A few days ago had fever. Admits to cough. Had recent covid test that was negative. States had some cp and sob x few days.

## 2022-06-22 NOTE — ED ADULT NURSE NOTE - NS ED NURSE IV DC DT
Final Anesthesia Post-op Assessment    Patient: Jenni Junior  Procedure(s) Performed: EXTRACTION, TOOTH - BILATERAL  Anesthesia type: General    Vitals Value Taken Time   Temp 36.2 °C (97.2 °F) 05/12/22 1230   Pulse 100 05/12/22 1230   Resp 20 05/12/22 1230   SpO2 100 % 05/12/22 1230   /74 05/12/22 1230         Patient Location: PACU Phase 2  Post-op Vital Signs:stable  Level of Consciousness: participates in exam, awake, oriented, answers questions appropriately and alert  Respiratory Status: spontaneous ventilation  Cardiovascular blood pressure returned to baseline  Hydration: euvolemic  Pain Management: well controlled  Handoff: Handoff to receiving nurse was performed and questions were answered  Nausea: None  Airway Patency:patent  Post-op Assessment: awake, alert, appropriately conversant, or baseline and no complications      No complications documented.    22-Jun-2022 18:35

## 2022-06-22 NOTE — ED PROVIDER NOTE - NSFOLLOWUPINSTRUCTIONS_ED_ALL_ED_FT
Vertigo is the feeling that you or the things around you are moving when they are not. This feeling can come and go at any time. Vertigo often goes away on its own. This condition can be dangerous if it happens when you are doing activities like driving or working with machines.    Your doctor will do tests to find the cause of your vertigo. These tests will also help your doctor decide on the best treatment for you.      Follow these instructions at home:      Eating and drinking       A comparison of three sample cups showing dark yellow, yellow, and pale yellow urine.       A sign showing that a person should not drink beer, wine, or liquor.     •Drink enough fluid to keep your pee (urine) pale yellow.      • Do not drink alcohol.      Activity     •Return to your normal activities when your doctor says that it is safe.      •In the morning, first sit up on the side of the bed. When you feel okay, stand slowly while you hold onto something until you know that your balance is fine.      •Move slowly. Avoid sudden body or head movements or certain positions, as told by your doctor.      •Use a cane if you have trouble standing or walking.      •Sit down right away if you feel dizzy.      •Avoid doing any tasks or activities that can cause danger to you or others if you get dizzy.      •Avoid bending down if you feel dizzy. Place items in your home so that they are easy for you to reach without bending or leaning over.      • Do not drive or use machinery if you feel dizzy.      General instructions     •Take over-the-counter and prescription medicines only as told by your doctor.      •Keep all follow-up visits.        Contact a doctor if:    •Your medicine does not help your vertigo.      •Your problems get worse or you have new symptoms.      •You have a fever.      •You feel like you may vomit (nauseous), or this feeling gets worse.      •You start to vomit.      •Your family or friends see changes in how you act.      •You lose feeling (have numbness) in part of your body.      •You feel prickling and tingling in a part of your body.        Get help right away if:    •You are always dizzy.      •You faint.      •You get very bad headaches.      •You get a stiff neck.      •Bright light starts to bother you.      •You have trouble moving or talking.      •You feel weak in your hands, arms, or legs.      •You have changes in your hearing or in how you see (vision).      These symptoms may be an emergency. Get help right away. Call your local emergency services (911 in the [place]   • Do not wait to see if the symptoms will go away.        • Do not drive yourself to the hospital.         Summary    •Vertigo is the feeling that you or the things around you are moving when they are not.      •Your doctor will do tests to find the cause of your vertigo.      •You may be told to avoid some tasks, positions, or movements.      •Contact a doctor if your medicine is not helping, or if you have a fever, new symptoms, or a change in how you act.      •Get help right away if you get very bad headaches, or if you have changes in how you speak, hear, or see.      This information is not intended to replace advice given to you by your health care provider. Make sure you discuss any questions you have with your health care provider.

## 2022-10-20 ENCOUNTER — EMERGENCY (EMERGENCY)
Facility: HOSPITAL | Age: 32
LOS: 1 days | Discharge: HOME | End: 2022-10-20
Attending: EMERGENCY MEDICINE | Admitting: EMERGENCY MEDICINE
Payer: MEDICAID

## 2022-10-20 VITALS
DIASTOLIC BLOOD PRESSURE: 78 MMHG | OXYGEN SATURATION: 100 % | HEART RATE: 62 BPM | RESPIRATION RATE: 18 BRPM | TEMPERATURE: 99 F | WEIGHT: 179.9 LBS | SYSTOLIC BLOOD PRESSURE: 110 MMHG | HEIGHT: 62 IN

## 2022-10-20 LAB
ALBUMIN SERPL ELPH-MCNC: 3.6 G/DL — SIGNIFICANT CHANGE UP (ref 3.3–5)
ALP SERPL-CCNC: 90 U/L — SIGNIFICANT CHANGE UP (ref 40–120)
ALT FLD-CCNC: 39 U/L — SIGNIFICANT CHANGE UP (ref 12–78)
ANION GAP SERPL CALC-SCNC: 6 MMOL/L — SIGNIFICANT CHANGE UP (ref 5–17)
APPEARANCE UR: ABNORMAL
AST SERPL-CCNC: 26 U/L — SIGNIFICANT CHANGE UP (ref 15–37)
BASOPHILS # BLD AUTO: 0.02 K/UL — SIGNIFICANT CHANGE UP (ref 0–0.2)
BASOPHILS NFR BLD AUTO: 0.4 % — SIGNIFICANT CHANGE UP (ref 0–2)
BILIRUB SERPL-MCNC: 0.6 MG/DL — SIGNIFICANT CHANGE UP (ref 0.2–1.2)
BILIRUB UR-MCNC: NEGATIVE — SIGNIFICANT CHANGE UP
BUN SERPL-MCNC: 9 MG/DL — SIGNIFICANT CHANGE UP (ref 7–23)
CALCIUM SERPL-MCNC: 8.6 MG/DL — SIGNIFICANT CHANGE UP (ref 8.5–10.1)
CHLORIDE SERPL-SCNC: 102 MMOL/L — SIGNIFICANT CHANGE UP (ref 96–108)
CO2 SERPL-SCNC: 30 MMOL/L — SIGNIFICANT CHANGE UP (ref 22–31)
COLOR SPEC: YELLOW — SIGNIFICANT CHANGE UP
CREAT SERPL-MCNC: 0.63 MG/DL — SIGNIFICANT CHANGE UP (ref 0.5–1.3)
DIFF PNL FLD: ABNORMAL
EGFR: 123 ML/MIN/1.73M2 — SIGNIFICANT CHANGE UP
EOSINOPHIL # BLD AUTO: 0.04 K/UL — SIGNIFICANT CHANGE UP (ref 0–0.5)
EOSINOPHIL NFR BLD AUTO: 0.8 % — SIGNIFICANT CHANGE UP (ref 0–6)
GLUCOSE SERPL-MCNC: 82 MG/DL — SIGNIFICANT CHANGE UP (ref 70–99)
GLUCOSE UR QL: NEGATIVE — SIGNIFICANT CHANGE UP
HCG SERPL-ACNC: <1 MIU/ML — SIGNIFICANT CHANGE UP
HCT VFR BLD CALC: 43.1 % — SIGNIFICANT CHANGE UP (ref 34.5–45)
HGB BLD-MCNC: 14.1 G/DL — SIGNIFICANT CHANGE UP (ref 11.5–15.5)
IMM GRANULOCYTES NFR BLD AUTO: 0.4 % — SIGNIFICANT CHANGE UP (ref 0–0.9)
KETONES UR-MCNC: NEGATIVE — SIGNIFICANT CHANGE UP
LEUKOCYTE ESTERASE UR-ACNC: ABNORMAL
LIDOCAIN IGE QN: 91 U/L — SIGNIFICANT CHANGE UP (ref 73–393)
LYMPHOCYTES # BLD AUTO: 1.31 K/UL — SIGNIFICANT CHANGE UP (ref 1–3.3)
LYMPHOCYTES # BLD AUTO: 27.3 % — SIGNIFICANT CHANGE UP (ref 13–44)
MCHC RBC-ENTMCNC: 24.6 PG — LOW (ref 27–34)
MCHC RBC-ENTMCNC: 32.7 GM/DL — SIGNIFICANT CHANGE UP (ref 32–36)
MCV RBC AUTO: 75.2 FL — LOW (ref 80–100)
MONOCYTES # BLD AUTO: 0.51 K/UL — SIGNIFICANT CHANGE UP (ref 0–0.9)
MONOCYTES NFR BLD AUTO: 10.6 % — SIGNIFICANT CHANGE UP (ref 2–14)
NEUTROPHILS # BLD AUTO: 2.89 K/UL — SIGNIFICANT CHANGE UP (ref 1.8–7.4)
NEUTROPHILS NFR BLD AUTO: 60.5 % — SIGNIFICANT CHANGE UP (ref 43–77)
NITRITE UR-MCNC: NEGATIVE — SIGNIFICANT CHANGE UP
NRBC # BLD: 0 /100 WBCS — SIGNIFICANT CHANGE UP (ref 0–0)
PH UR: 6.5 — SIGNIFICANT CHANGE UP (ref 5–8)
PLATELET # BLD AUTO: 194 K/UL — SIGNIFICANT CHANGE UP (ref 150–400)
POTASSIUM SERPL-MCNC: 3.6 MMOL/L — SIGNIFICANT CHANGE UP (ref 3.5–5.3)
POTASSIUM SERPL-SCNC: 3.6 MMOL/L — SIGNIFICANT CHANGE UP (ref 3.5–5.3)
PROT SERPL-MCNC: 7.9 G/DL — SIGNIFICANT CHANGE UP (ref 6–8.3)
PROT UR-MCNC: 15
RBC # BLD: 5.73 M/UL — HIGH (ref 3.8–5.2)
RBC # FLD: 13.6 % — SIGNIFICANT CHANGE UP (ref 10.3–14.5)
SODIUM SERPL-SCNC: 138 MMOL/L — SIGNIFICANT CHANGE UP (ref 135–145)
SP GR SPEC: 1.01 — SIGNIFICANT CHANGE UP (ref 1.01–1.02)
UROBILINOGEN FLD QL: 4
WBC # BLD: 4.79 K/UL — SIGNIFICANT CHANGE UP (ref 3.8–10.5)
WBC # FLD AUTO: 4.79 K/UL — SIGNIFICANT CHANGE UP (ref 3.8–10.5)

## 2022-10-20 PROCEDURE — 76705 ECHO EXAM OF ABDOMEN: CPT | Mod: 26

## 2022-10-20 PROCEDURE — 99285 EMERGENCY DEPT VISIT HI MDM: CPT

## 2022-10-20 PROCEDURE — 81001 URINALYSIS AUTO W/SCOPE: CPT

## 2022-10-20 PROCEDURE — 80053 COMPREHEN METABOLIC PANEL: CPT

## 2022-10-20 PROCEDURE — 84702 CHORIONIC GONADOTROPIN TEST: CPT

## 2022-10-20 PROCEDURE — 76705 ECHO EXAM OF ABDOMEN: CPT

## 2022-10-20 PROCEDURE — 99284 EMERGENCY DEPT VISIT MOD MDM: CPT | Mod: 25

## 2022-10-20 PROCEDURE — 85025 COMPLETE CBC W/AUTO DIFF WBC: CPT

## 2022-10-20 PROCEDURE — 96375 TX/PRO/DX INJ NEW DRUG ADDON: CPT

## 2022-10-20 PROCEDURE — 36415 COLL VENOUS BLD VENIPUNCTURE: CPT

## 2022-10-20 PROCEDURE — 83690 ASSAY OF LIPASE: CPT

## 2022-10-20 PROCEDURE — 96374 THER/PROPH/DIAG INJ IV PUSH: CPT

## 2022-10-20 RX ORDER — SODIUM CHLORIDE 9 MG/ML
1000 INJECTION INTRAMUSCULAR; INTRAVENOUS; SUBCUTANEOUS ONCE
Refills: 0 | Status: COMPLETED | OUTPATIENT
Start: 2022-10-20 | End: 2022-10-20

## 2022-10-20 RX ORDER — ACETAMINOPHEN 500 MG
1000 TABLET ORAL ONCE
Refills: 0 | Status: COMPLETED | OUTPATIENT
Start: 2022-10-20 | End: 2022-10-20

## 2022-10-20 RX ORDER — ONDANSETRON 8 MG/1
4 TABLET, FILM COATED ORAL ONCE
Refills: 0 | Status: COMPLETED | OUTPATIENT
Start: 2022-10-20 | End: 2022-10-20

## 2022-10-20 RX ORDER — FAMOTIDINE 10 MG/ML
20 INJECTION INTRAVENOUS ONCE
Refills: 0 | Status: COMPLETED | OUTPATIENT
Start: 2022-10-20 | End: 2022-10-20

## 2022-10-20 RX ORDER — FAMOTIDINE 10 MG/ML
20 INJECTION INTRAVENOUS ONCE
Refills: 0 | Status: DISCONTINUED | OUTPATIENT
Start: 2022-10-20 | End: 2022-10-24

## 2022-10-20 RX ADMIN — FAMOTIDINE 20 MILLIGRAM(S): 10 INJECTION INTRAVENOUS at 15:43

## 2022-10-20 RX ADMIN — Medication 400 MILLIGRAM(S): at 15:43

## 2022-10-20 RX ADMIN — ONDANSETRON 4 MILLIGRAM(S): 8 TABLET, FILM COATED ORAL at 15:39

## 2022-10-20 RX ADMIN — SODIUM CHLORIDE 1000 MILLILITER(S): 9 INJECTION INTRAMUSCULAR; INTRAVENOUS; SUBCUTANEOUS at 15:43

## 2022-10-20 NOTE — ED PROVIDER NOTE - PATIENT PORTAL LINK FT
You can access the FollowMyHealth Patient Portal offered by [facility] by registering at the following website: [email or web address]. By joining [employer]’s FollowMyHealth portal, you will also be able to view your health information using other applications (apps) compatible with our system.

## 2022-10-20 NOTE — ED PROVIDER NOTE - PROGRESS NOTE DETAILS
Reviewed with GI advised outpatient follow up. All questions were answered. Discussed the importance of prompt, close medical follow-up. Patient will return with any changes, concerns or persistent/worsening symptoms.  Patient verbalized understanding.

## 2022-10-20 NOTE — ED PROVIDER NOTE - CLINICAL SUMMARY MEDICAL DECISION MAKING FREE TEXT BOX
Patient complaining of epigastric/right upper quadrant abdominal pain radiating to back associated with nausea vomiting and diarrhea since yesterday.  Patient relates had been drinking alcohol the night before her symptoms began.  No fevers chills dysuria hematuria frequency chest pain short of breath cough.    Plan labs ultrasound Zofran Pepcid IV fluid and Tylenol

## 2022-10-20 NOTE — ED ADULT NURSE NOTE - CHIEF COMPLAINT QUOTE
Pt states that she was sent over by  to r/o pancreatitis. Pt states she has been having RUQ abd pain, N/V/D, R lower back pain since Wednesday morning. Pt states symptoms started after drinking [place] the night before.

## 2022-10-20 NOTE — ED PROVIDER NOTE - PHYSICAL EXAMINATION
Constitutional: Awake, Alert, non-toxic. NAD. Well appearing, well nourished.   HEAD: Normocephalic, atraumatic.   EYES: EOM intact, conjunctiva and sclera are clear bilaterally.   ENT: No rhinorrhea, patent, mucous membranes pink/moist, no drooling or stridor.   NECK: Supple, non-tender  CARDIOVASCULAR: Normal S1, S2; regular rate and rhythm.  RESPIRATORY: Normal respiratory effort; breath sounds CTAB, no wheezes, rhonchi, or rales. Speaking in full sentences. No accessory muscle use.   ABDOMEN: Soft; (+) epigastric and RUQ TTP, no guarding or rebound TTP. negative Pichardo sign. (+) minimal right CVAT   EXTREMITIES: Full passive and active ROM in all extremities; non-tender to palpation; distal pulses palpable and symmetric  SKIN: Warm, dry; good skin turgor, no apparent lesions or rashes, no ecchymosis, brisk capillary refill.  NEURO: A&O x3. Sensory and motor functions are grossly intact. Speech is normal. Appearance and judgement seem appropriate for gender and age.

## 2022-10-20 NOTE — ED PROVIDER NOTE - NSFOLLOWUPINSTRUCTIONS_ED_ALL_ED_FT
Stop drinking alcohol. Return for increasing pain, fever, vomiting, black stools, worsening condition. Consider use of Nexium for protective agent for stomach.     Many things can cause abdominal pain. Many times, abdominal pain is not caused by a disease and will improve without treatment. Your health care provider will do a physical exam to determine if there is a dangerous cause of your pain; blood tests and imaging may help determine the cause of your pain. However, in many cases, no cause may be found and you may need further testing as an outpatient. Monitor your abdominal pain for any changes.     SEEK IMMEDIATE MEDICAL CARE IF YOU HAVE ANY OF THE FOLLOWING SYMPTOMS: worsening abdominal pain, uncontrollable vomiting, profuse diarrhea, inability to have bowel movements or pass gas, black or bloody stools, fever accompanying chest pain or back pain, or fainting. These symptoms may represent a serious problem that is an emergency. Do not wait to see if the symptoms will go away. Get medical help right away. Call 911 and do not drive yourself to the hospital.

## 2022-10-20 NOTE — ED ADULT TRIAGE NOTE - CHIEF COMPLAINT QUOTE
Pt states that she was sent over by  to r/o pancreatitis. Pt states she has been having RUQ abd pain, N/V/D, R lower back pain since Wednesday morning. Pt states symptoms started after drinking [place] the night before.

## 2022-10-20 NOTE — ED PROVIDER NOTE - CARE PROVIDER_API CALL
Baldomero Edwards ()  Internal Medicine  [address] [place] [address]  Phone: [phone]  Fax: [phone]  Follow Up Time: 1-3 Days

## 2022-10-20 NOTE — ED PROVIDER NOTE - OBJECTIVE STATEMENT
30 y/o female without reported PMHx sent by urgent care due to abdominal pain x 2 days. describes pain as aching, non-radiating, and currently 8/10. pt reports she drank a few [place] Tuesday night. She had diarrhea every 30 minutes and vomited 4x. pt was advised to come to ED due to concern for pancreatitis. pt denies fever, dysuria, hematuria, melena, cp, sob, or any other complaints. 32 y/o female without reported PMHx sent by urgent care due to abdominal pain x 2 days. describes pain as aching, non-radiating, and currently 8/10. pt reports she drank [place] Tuesday night. She had diarrhea every 30 minutes and vomited 4x. pt was advised to come to ED due to concern for pancreatitis. pt denies fever, dysuria, hematuria, melena, cp, sob, or any other complaints.

## 2023-01-01 NOTE — ED ADULT NURSE NOTE - ED CARDIAC RATE
[facility]   Intensive Care Unit Daily Note    Name: Cale (Male-Alton Breen   Parents: Halley and Cristobal Breen  YOB: 2021    History of Present Illness   Cale is a symmetrical SGA  male infant born at 27w2d, 14.1 oz (400 g) due to decels, minimal variability and severe growth restriction.    Patient Active Problem List   Diagnosis     Premature infant of 27 weeks gestation     Respiratory failure of      Feeding problem of       affected by IUGR     ELBW (extremely low birth weight) infant     SGA (small for gestational age)     Thrombocytopenia (H)     Direct hyperbilirubinemia     Thrombus of aorta (H)     Adrenal insufficiency (H)     Hypoglycemia     Anemia of prematurity     Metabolic bone disease of prematurity       Interval History    No acute events. Weight down 170 g.     Assessment & Plan     Overall Status:    5 month old  ELBW male infant born SGA at 27w2d PMA, who is now 49w1d PMA.     This patient is critically ill with respiratory failure requiring respiratory support.      Vascular Access:  PAL -- Anesthesia placed a right radial art PAL on .  LALY PICC -- placed by NNP on . Appropriate position by radiograph.  Right internal jugular and EJ lines were attempted by Dr. Marsh on , but were unsuccessful.    PAL removed    PICC  -   IR PICC, RLL (- removed by surgery)     SGA/IUGR: Symmetric. Prenatal course suggests placental insufficiency as etiology. Negative uCMV. HUS negative for calcifications.   - Consider Genetics consult and chromosome analysis depending on clinical course (previous child loss at [facility] on DOL 3 at 26 weeks gestation (280g)- plan to send prior to discharge when Hgb more robust.   - ROP exam (see Ophthalmology)    FEN/GI:    Vitals:    23 0000 23 0000 23 0000   Weight: 4.16 kg (9 lb 2.7 oz) 4.12 kg (9 lb 1.3 oz) 3.95 kg (8 lb 11.3 oz)      Using a dry wt of 3.5 kg (adjusted 5/30)    Growth: Symmetric SGA at birth. Moderate Protein-Calorie Malnutrition.    144 mL/kg/day; 83 kcal/kg/day  UOP: 5.6 ml/kg/hr, no stool, OG 36 mL  + small amount unmeasured output from wound vac    FEN/GI  Intraperitoneal and retroperitoneal fluid collection. Underwent ex lap on 5/17. Surgeon: Dr. Marsh  A large whitish fluid collection in the peritoneal cavity (~500 mL), intestinal adhesions and a small intestinal perf (likely due to inadvertent enterotomy) were found. The enterotomy was sutured. Peritoneal fluid composition was suspicious for TPN (high glucose). Hence a contrast study was done on 5/19, showing extravascular extravasation of the contrast medium (probably, both intraperitoneal and retroperitoneal).    Underwent abd wash 7 times, most recently 5/30 with wound vac placement. Next washout/wound vac change 6/2.   Gastrostomy placed by Dr. Marsh on 5/24    Plan:  -  mL/kg/day   - NPO to LCS  - Furosemide 0.5/kg/dose q8h (increased back to q8h 6/1 in setting of pleural effusion)  - Diuril 20 mg/kg divided BID  - Custom TPN (GIR 12 AA 4 and SMOF 3.5)   - AM BMP, iCal, lacate, weekly LFT  - Needs repeat copper level in the future when inflammation improved     Previously  - 26 kcal/ounce MOM with sHMF for Ca/Phos (last fortified 4/30), 32 ml q3hr given over 45 min until 5/15.   - Labs: Check Ca, Mn and Zn intermittently while on TPN, GI labs for prolonged TPN can be spread out to minimize blood volume (see GI consult note).   - Prior meds: Miralax, glycerin suppositories, erythromycin for pro-motility, scheduled simethicone  - h/o rectal irrigations TID with concerns for Hirschprung's (ruled out by rectal biopsy)    Feeding Intolerance, chronic and history of incarcerated hernia s/p ex lap with bilateral hernia repair. Surgeon: Maximo  - Consider hernia repair closer to discharge or if unable to continue PO feedings.    Previous GI History:  2/4  Acute decompensation with worsening respiratory distress, poor perfusion, spells and abdominal distension concerning for sepsis. NEC workup showed high CRP up to 230, hyponatremia 126, lactic acidosis and thrombocytopenia. Serial AXRs revealed possible pneumatosis but no free air. He did continue to have worsening thrombocytopenia with increasing lethargy and erythema of abdominal wall on 2/7, as well as increased fullness in scrotum with increasing fluid complexity. Decision was made to proceed with exploratory laparotomy on 2/7 which revealed closed loop bowel obstruction due to obstructed inguinal hernia, no evidence of NEC. Abdomen was kept open with [place] and subsequently closed on 2/9. He has developed a right inguinal hernia recurrence .Post-op ex lap and silo placement (2/7, Maximo) and abd wall closure (2/9), bilateral hernia repair in the context of incarcerated hernia.   2/21 Repeat ultrasound with irritability 2/21 with hernia recurrence but with adequate blood flow.  Right inguinal hernia recurrence- easily reducible.   3/10: Abd U/S: Continued diffuse echogenic distended bowel with wall thickening and hyperemia. No appreciable pneumatosis or portal venous gas. Scrotal and testicular US on the same day showed right bowel containing inguinal hernia. Perfusion by color and spectral Doppler argues against incarceration.  3/11: Abd US 1) Punctate echogenic focus in the right hepatic lobe, possibly a small calcification. 2) Continued distended bowel loops with wall thickening. 3) Distended gallbladder. No sludge or stones.  Contrast enema on 4/4: 1. No identified colonic stricture but the rectosigmoid ratio is abnormal. Consider suction biopsy if there is clinical concern for Hirschsprung's. 2. Large, bowel containing right inguinal hernia with tapering of the bowel lumens at the deep inguinal ring  - 4/6: Upper GI and small bowel follow through - nonobstructive; slow clearance of contrast.  4/18: Rectal  biopsy with ganglion cells present, negative for Hirschsprung's.     Osteopenia of Prematurity: Demineralized bones with signs of rickets. Healing proximal right femur fracture noted on 3/10 X-ray. There is also periosteal reaction in both humeri and suspicion for left ulna fracture.  - Optimize nutrition as able  - Gentle handling  - OT consult  - Alk Phos qMon until <400    Lab Results   Component Value Date    ALKPHOS 576 (H) 2023    ALKPHOS 399 2023       Respiratory: Severe BPD with minimal clamp down spells (improved over time), requiring chronic ventilation. Escalation of respiratory support overnight on 5/16 due to abdominal distension. Was on HFOV at high settings pre-operatively, improved and stable settings since then.     Current support: HFOV, MAP 20, Amp 55, Hz 9, FiO2 45-93% (improved -- mostly in the 40s)     - Wean Hz to 10 prior to evening gas  - Monitor ABG q12h  - Pulmozyme PRN to help mobilize any plugs (previously helpful, but minimal response 6/1)  - IV Diuril 20 mg/kg/day   - furosemide 0.5 mg/kg/dose BID (decreased 5/31)  - Transition to H-tape (having a lot of play in tube), OK for fish lip on short term basis if concern for tube stability. Reassess 6/5 whether appropriateness of safe transition back to neobar.     Previously  - Pulmicort nebs BID  - Xopenex nebs q12h  - NaCl gel application to the nares restarted 5/5  - Pulmonary consulted   - ENT consulted for endoscopic airway assessment (tracheomalacia, subglottic stenosis), Bronch 4/12 (see procedure note, no malacia) - recommend re-eval if this extubation trial is not successful  - Genetics consulted for genetic etiologies contributing to severe BPD, see consult note, family will move forward, evaluating lab schedule to determine when to draw genetic labs - plan to draw with improvement in Hgb.    Extubation Hx:  -Extubated 3/22-4/7, re-intubated for increased FiO2/WOB  -Extubated 5/5-5/12, re-intubated for tachypnea,  increased FiO2 in setting of abdominal distention and minimal stool output    Steroid Hx:       - DART (3/16-3/26); 4/1-4/6       - methylprednisone burst (1/24-1/29 and 3/3-3/8), clinically responded   - Dexamethasone 4/1 due to most recent inflammatory episode. Stopped on 4/6 (as no improvement and irritable)               - Solumedrol (5/4-5/8)    Cardiovascular: BP stable on epi 0.03 and dopamine off since 5/31.   - hydrocortisone 2 mg/kg/day, consider weaning 6/4 if remains off pressors  - epinephrine OFF since 6/2 afternoon  - CR monitoring    - Echo 5/24: Normal cardiac function. Large echogenic area seen posterior and lateral to left ventricle, possibly representing fibrinous clot. There is no compression of the heart.   - Repeat Echo on 5/26 - collection not well visualized.  - Repeat Echo on 5/30 -- no echogenic area noted.      Previous Hx:  PDA s/p tylenol 1/13 x 5 days  - Weekly EKGs while on erythromycin (to monitor QTc interval) - now held  - Echo: 4/28 no PDA, normal structure/function, no PPHN. No changes in pressures.   Hx: Had bradycardia needing chest compressions for ~5 min at the beginning of the procedure. Bradycardia resolved once MAP on HFOV was decreased.   Needed blood products, crystalloids, NaHCO3, dextrose boluses and calcium boluses during the procedure.    Endocrinology: Adrenal insufficiency with history of cortisol <1.  - He will require ACTH stim test 1-2 weeks off steroids.    Previously: Decreased urine output, hyponatremia and hyperkalemia on 1/7, cortisol 13, started on hydrocortisone with significant improvement. Hydrocortisone weaned off 1/23. Restarted 1/30 for signs of adrenal insufficiency and cortisol level 2.6. Stopped on 3/2 when methylpred was started.     Hx: Was on Hydrocortisone (0.35 mg/kg/day divided q12). Serum cortisol on 5/16: 65 - Increased with acute illness. Started on stress dose hydrocort on 5/17 and maintenance dose increased to 4 mg/kg/day. Currently at  2/kg/d    Renal: JASMYNE with oliguria (5/16) --> anuria (5/17) in the setting of abdominal compartment syndrome and acute illness. Resolving.    ESPERANZA (5/19)  - New mild right hydronephrosis, medical renal disaese, patent arteries and veins, unchanged echogenic foci (calcifications?) bilaterally.      Creatinine   Date Value Ref Range Status   2023 0.30 0.16 - 0.39 mg/dL Final   2023 0.31 0.16 - 0.39 mg/dL Final   2023 0.35 0.16 - 0.39 mg/dL Final   2023 0.39 0.16 - 0.39 mg/dL Final   2023 0.44 (H) 0.16 - 0.39 mg/dL Final      - Monitor serial creatinine and UOP  - Follow serial ESPERANZA,  next ~6/11  - Minimize lasix exposure as able given nephrolithiasis and osteopenia.    ID:  Worked up for sepsis on 5/15 due to abdominal distension.  BC pos for Staph epidermidis 5/15 and 5/16. Subsequent BC neg thus far.  UC pos for Staph epidermidis (10-50K) and Staph lugdunensis. Trach >25 PMN, Gm pos cocci. Peritoneal fluid pos for Staph epi  - monitor CRP periodically, next ~6/7  On Vanco (5/15-), ceftaz (5/15-)   Was also on well as flagyl (5/17-5/24) and micafungin (5/17-5/24).     History:  3/7 Concern for sepsis due to recurrent bradycardia episodes needing bagging and pallor. BC/UC NGTD. ETT Gram pos cocci is normal puma, >25 PMN. Treated with Vanc for 7 days.  3/10 lethargy and abd distension. 3/10 BC NGTD.  CSF NGTD (sent after starting antibiotics). CSF glucose and protein are high. RBC and WBC present (could be due to blood in CSF).  3/10 CRP 70, 3/11 , 3/12 , 3/13 CRP 65, 3/15 CRP 8, 3/16 CRP 3  Was on Gent 3/7-3/7, 3/10-3/11   Was on Vanc (started 3/7 for ETT GPC). Stopped 3/16  Was on Ceftaz (started 3/11).  Stopped 3/16  3/11: Urine CMV neg (for the 3rd time). LFT shows elevated AST and ALT, normal GGT (see GI for US results).  Septic eval with  on 3/27; decreased to 136 3/29; CRP 23 3/31; CRP 4/3: < 3  - Vanc and gent stopped at 48 hours  - BCx and UCx NGTD  3/30 With  agitation and periods of decresed activity, restarted abx and obtain new blood and urine cultures  - vanco and gent-stop 4/1  S/p 5 days of vancomycin 1/24 for tracheitis.    2/4 with spells, distention and pale with poor perfusion, +pneumatosis on AXR. BC Staph hominis. ETT Staph epi. Repeat BCx 2/5 and 2/6 negative. Completed 14 days of vancomycin on 2/19. Completed 7 days Gent/flagyl 2/16.    Hematology: Coagulopathy with large volume of PRBC, FFP, Plt, and cryoprecipitate transfusion intra- and post-operatively.   - Monitor Hgb/plt Friday/Monday goal hgb >12, goal plts >70   - Iron supplementation- Held until feeding is established    Previously:  Anemia of prematurity/phlebotomy, thrombocytopenia (resolved), arterial thrombus (resolved), continued distal aorta/right common iliac artery fibrin  Sheath - stable and last visualized by US on 4/6.  S/p darbepoietin.     Recent Labs   Lab 06/03/23  0600 06/02/23  0550 05/31/23  0608 05/30/23  1650 05/30/23  0534   HGB 13.6 13.2 14.2* 14.6* 14.2*         Hemoglobin   Date Value Ref Range Status   2023 13.6 10.5 - 14.0 g/dL Final   2023 13.2 10.5 - 14.0 g/dL Final   2023 14.2 (H) 10.5 - 14.0 g/dL Final     Platelet Count   Date Value Ref Range Status   2023 243 150 - 450 10e3/uL Final   2023 240 150 - 450 10e3/uL Final   2023 205 150 - 450 10e3/uL Final     Ferritin   Date Value Ref Range Status   2023 149 ng/mL Final   2023 201 ng/mL Final   2023 371 ng/mL Final     Hyperbilirubinemia/GI: Maternal blood type O+. Infant blood type O+ LEON-. Phototherapy 1/2 - 1/5. Resolved.    > Direct hyperbilirubinemia: Mother's placental pathology consistent with autoimmune process, chronic histiocytic intervillositis. Consulted GI, concerned for DB elevation out of proportion to duration of NPO/TPN. Potential for gestational alloimmune liver disease (GALD). Received IVIG on 1/16. Now concern for GALD is much lower. Mother has had  placental path done which does not suggest this possibility.   - GI consulting  - Ursodiol - holding   - DBili, LFTs q weekly    Lab Results   Component Value Date    ALT 82 (H) 2023    AST 71 (H) 2023    GGT 97 2023    DBIL 1.88 (H) 2023    DBIL 1.85 (H) 2023    BILITOTAL 2.5 (H) 2023    BILITOTAL 2.4 (H) 2023       CNS: HUS DOL 3 for worsening metabolic acidosis and anemia: no intracranial hemorrhage. Repeat DOL 5 stable. 2/27: Repeat HUS at ~35-36 wks GA (eval for PVL): The ventricles are nonenlarged, however are slightly more prominent than on the 1/6/23 examination, and the extra-axial CSF subarachnoid spaces are mildly enlarged.  - Weekly OFC measurements     Hx of Irritability: Looked for common causes on 4/6 - no renal stones, probably no otitis media (had ear wax), upper and lower limb x-rays - No definite acute fracture. Asymmetric subperiosteal thickening in the right humerus and left femur, suspicious for subacute, nondisplaced fractures. Symmetric irregularity of the proximal humeral metaphysis may represent healing injury or sequela from metabolic bone disease. Offset of the distal ulna without other evidence of cortical disruption.    Pain control:   Hydromorphone gtt 0.035 + PRN  Precedex 0.25 (started 5/31)  Narcan 0.5 (started 6/1). Can increase to max of 2 per PAACT.   Versed gtt 0.18 + PRN  Rocuronium PRN  Vec drip stopped 5/31.    PACCT consulted    Previously:  - Gabapentin Q8 (3/21-) - increased 3/31, 4/26, 5/9  - Melatonin QHS  - Dr Larsen (PM&R) consulting given increased tone and irritability  - Consult integrative medicine for non-pharmacological measures    Ophthalmology: At risk for ROP due to prematurity. First ROP exam 1/31 with findings of vitreous haze bilaterally.   2/14 Zone 2 st 0, f/u 2 weeks  2/28 Zone 2 st 1, f/u 2 weeks  3/14 Zone 2 st 2  3/24: Zone 2, st 2  4/4: Zone II, st 2 (regressing)  4/18: Zone II, st 2, f/u 2 weeks f/u 2  weeks  : Zone 2, stage 2, f/u 3 weeks   & : deferred due to critical status     : stage 3, stage 1, follow-up 3 weeks ()    Wound:  Erythematous lesion in the scalp near the site of former PIV - improving.  - WOC consulted.    Harm incident:  Administration contacted to address parent concerns  - [facility] consulted  - Recs: - Fast MRI to assess for brain hemorrhage              - Skeletal survey              - Assessment of Vit D status  Imaging recommendations discussed with family after they met with [facility] consult. They were reassured by the XR obtained overnight. Parents do not feel like an MRI is necessary; they were more concerned about extremity fractures based on this bone status, but do not think he needs further XR. We agreed to continue to discuss the recommendations.     : Discussed with Piper from [facility]. Recommend 1)  limited upper limb and lower limb skeletal survey. 2) Endocrinology consult and 3) Genetic consult (to assess for skeletal dysplasia). We will review with the parents.    Psychosocial: Social work involved.   - PMAD screening: plan for routine screening for parents at 6 months if infant remains hospitalized.     HCM and Discharge planning:   Screening tests indicated:  - [place] 24 hr - SCID+  - Repeat NMS at 14 do - normal for interpretable labs s/p transfusion. Unable to evaluate SCID due to transfusion hx  - Final repeat NMS at 30 do - normal for interpretable labs s/p transfusion. Unable to evaluate SCID due to transfusion hx. Needs f/u NBS 90days after last prbc transfusion  - CCHD screen - fulfilled with Echocardiogram  - Hearing screen PTD  - Carseat trial to be done just PTD  - OT input.  - Continue standard NICU cares and family education plan.  - NICU Neurodevelopment Follow-up Clinic.    Immunizations   - Plan for Synagis administration during RSV season (<29 wk GA).  Immunization History    Administered Date(s) Administered     DTAP-IPV/HIB (PENTACEL) 2023, 2023     Hepatits B (Peds <19Y) 2023, 2023     Pneumo Conj 13-V (2010&after) 2023, 2023        Medications   Current Facility-Administered Medications   Medication     artificial tears ophthalmic ointment     Breast Milk label for barcode scanning 1 Bottle     [Held by provider] budesonide (PULMICORT) neb solution 0.25 mg     cefTAZidime (FORTAZ) in D5W injection PEDS/NICU 176 mg     chlorothiazide (DIURIL) 35 mg in sterile water (preservative free) injection     cyclopentolate-phenylephrine (CYCLOMYDRYL) 0.2-1 % ophthalmic solution 1 drop     dexmedetomidine (PRECEDEX) 4 mcg/mL in sodium chloride 0.9 % 50 mL infusion PEDS     [Held by provider] diazepam (VALIUM) injection 0.1 mg     [Held by provider] DOPamine (INTROPIN) 3.2 mg/mL in D5W 50 mL infusion PEDS/NICU     [Held by provider] EPINEPHrine (ADRENALIN) 0.02 mg/mL in D5W 20 mL infusion     furosemide (LASIX) pediatric injection 1.8 mg     [Held by provider] gabapentin (NEURONTIN) solution 20.5 mg     glycerin (ADULT) Suppository 0.125 suppository     glycerin (PEDI-LAX) Suppository 0.125 suppository     heparin lock flush 10 UNIT/ML injection 1 mL     hydrocortisone sodium succinate (SOLU-CORTEF) 1.58 mg in NS injection PEDS/NICU     HYDROmorphone (DILAUDID) injection 0.104 mg     HYDROmorphone PF (DILAUDID) 0.2 mg/mL in D5W 20 mL PEDS/NICU infusion     [Held by provider] levalbuterol (XOPENEX) neb solution 0.31 mg     levalbuterol (XOPENEX) neb solution 0.31 mg     lipids 4 oil (SMOFLIPID) 20% for neonates (Daily dose divided into 2 doses - each infused over 10 hours)     midazolam (VERSED) 1 mg/mL in sodium chloride 0.9 % 20 mL infusion     midazolam (VERSED) injection 0.55 mg     NaCl 0.45 % with heparin 1 Units/mL infusion     naloxone (NARCAN) 0.01 mg/mL in D5W 20 mL infusion     naloxone (NARCAN) injection 0.032 mg     parenteral nutrition - INFANT  compounded formula     rocuronium injection 2.1 mg     sodium chloride 0.45% lock flush 0.1-0.2 mL     sodium chloride 0.45% lock flush 0.5 mL     sodium chloride 0.45% lock flush 0.8 mL     sodium chloride 0.45% lock flush 0.8 mL     sodium chloride 0.45% with heparin 1 unit/mL and papaverine 6 mg in 50 mL infusion     sucrose (SWEET-EASE) solution 0.2-2 mL     tetracaine (PONTOCAINE) 0.5 % ophthalmic solution 1 drop     vancomycin (VANCOCIN) 50 mg in D5W injection PEDS/NICU        Physical Exam    GENERAL: Male infant supine in open bed. Anasarca  RESPIRATORY: HFOV sound equal bilaterally.   CV: RRR, no murmur, WWP  ABDOMEN: Wound vac in place. Visible intestine appears pink. G-tube site healing well  CNS: Sedate, appears comfortable.        Communications   Parents:   Name Home Phone Work Phone Mobile Phone Relationship Lgl Grd   KING NEVAREZ [phone] Father    EMERITA NEVAREZ [phone] Mother       Family lives in [place]. Had a previous 26 week IUGR son that passed away at [facility] at DOL 3.   Updated on rounds    Care Conferences:   Care conference 3/15 with KR  Care conference with GI, surgery, NICU 4/26. Care conference on 4/26 with surgery, GI, PACCT, nursing, x3 neos (ME, MP, CG), SW and parents. Discussed timing of feeding advancement and extubation attempt. Discussed priority is to assess fortifier tolerance in the next week, and continue to maximize fluid balance in preparation for potential extubation attempt with methylpred (instead of DART d/t [facility]) at 46-47 weeks gestation. If unable to fortify to 26 kcal/oz with sHMF will need to find another solution for Ca/Phos intake. Will trial EES to assess if motility agent is helpful. Will plan for 1 week course and discontinue if no improvement noted. PACCT to continue to maximize medications when we can fit around advancement in nutrition/extubation.     5/16: multi-disciplinary care conference with  nando (Jovan), peds pulm staff (Dr. Harvey), SW, Nurse Manager, PACCT NP and primary nurse to discuss with parents their concerns about pulmonary status, potential need for tracheostomy and anticipated course, potential need for and sequence of G-tube placement and hernia repair. Parents have expressed a wish for a second opinion from a Pediatric Gastroenterologist, which we will pursue.    5/19: Magdalene Aldana and Andrew informed parents about the results of the contrast study of the PICC and our plans to perform a RCA    5/24: Dr. Aldana informed parents of the results of the RCA - that extravasation of PICC was most likely the cause of intraabdominal and retroperitoneal fluid collection on 5/16.     PCPs:   Infant PCP: Physician No Ref-Primary  Maternal OB PCP:   Information for the patient's mother:  Halley Breen [[id number]]   Coleen Wagner   [facility]: [facility] (Jmae Galindo)  Delivering Provider: Miranda  Updated 3/30; 5/22    Health Care Team:  Patient discussed with the care team. A/P, imaging studies, laboratory data, medications and family situation reviewed.    Julia Rivera MD       tachycardic

## 2023-04-18 NOTE — ED ADULT NURSE NOTE - NS TRANSFER PATIENT BELONGINGS
Clothing Cibinqo Pregnancy And Lactation Text: It is unknown if this medication will adversely affect pregnancy or breast feeding.  You should not take this medication if you are currently pregnant or planning a pregnancy or while breastfeeding.

## 2023-05-22 ENCOUNTER — NON-APPOINTMENT (OUTPATIENT)
Age: 33
End: 2023-05-22

## 2023-11-04 ENCOUNTER — OFFICE (OUTPATIENT)
Dept: URBAN - METROPOLITAN AREA CLINIC 34 | Facility: CLINIC | Age: 33
Setting detail: OPHTHALMOLOGY
End: 2023-11-04
Payer: COMMERCIAL

## 2023-11-04 DIAGNOSIS — H00.025: ICD-10-CM

## 2023-11-04 DIAGNOSIS — H01.004: ICD-10-CM

## 2023-11-04 DIAGNOSIS — H00.022: ICD-10-CM

## 2023-11-04 DIAGNOSIS — H00.11: ICD-10-CM

## 2023-11-04 PROCEDURE — 92002 INTRM OPH EXAM NEW PATIENT: CPT | Performed by: OPHTHALMOLOGY

## 2023-11-04 ASSESSMENT — LID EXAM ASSESSMENTS
OS_MEIBOMITIS: LLL 1+
OD_MEIBOMITIS: RLL 2+
OS_BLEPHARITIS: LUL T

## 2023-11-04 ASSESSMENT — CONFRONTATIONAL VISUAL FIELD TEST (CVF)
OD_FINDINGS: FULL
OS_FINDINGS: FULL

## 2024-07-01 ENCOUNTER — NON-APPOINTMENT (OUTPATIENT)
Age: 34
End: 2024-07-01

## 2025-07-18 ENCOUNTER — EMERGENCY (EMERGENCY)
Facility: HOSPITAL | Age: 35
LOS: 1 days | End: 2025-07-18
Attending: STUDENT IN AN ORGANIZED HEALTH CARE EDUCATION/TRAINING PROGRAM | Admitting: STUDENT IN AN ORGANIZED HEALTH CARE EDUCATION/TRAINING PROGRAM
Payer: COMMERCIAL

## 2025-07-18 VITALS
WEIGHT: 134.04 LBS | DIASTOLIC BLOOD PRESSURE: 68 MMHG | OXYGEN SATURATION: 100 % | HEIGHT: 62 IN | RESPIRATION RATE: 18 BRPM | HEART RATE: 90 BPM | TEMPERATURE: 99 F | SYSTOLIC BLOOD PRESSURE: 96 MMHG

## 2025-07-18 LAB
ALBUMIN SERPL ELPH-MCNC: 4.3 G/DL — SIGNIFICANT CHANGE UP (ref 3.3–5)
ALP SERPL-CCNC: 116 U/L — SIGNIFICANT CHANGE UP (ref 30–120)
ALT FLD-CCNC: 72 U/L — HIGH (ref 10–60)
ANION GAP SERPL CALC-SCNC: 12 MMOL/L — SIGNIFICANT CHANGE UP (ref 5–17)
APPEARANCE UR: CLEAR — SIGNIFICANT CHANGE UP
AST SERPL-CCNC: 37 U/L — SIGNIFICANT CHANGE UP (ref 10–40)
BACTERIA # UR AUTO: ABNORMAL /HPF
BASOPHILS # BLD AUTO: 0.04 K/UL — SIGNIFICANT CHANGE UP (ref 0–0.2)
BASOPHILS NFR BLD AUTO: 0.5 % — SIGNIFICANT CHANGE UP (ref 0–2)
BILIRUB SERPL-MCNC: 0.3 MG/DL — SIGNIFICANT CHANGE UP (ref 0.2–1.2)
BILIRUB UR-MCNC: NEGATIVE — SIGNIFICANT CHANGE UP
BUN SERPL-MCNC: 10 MG/DL — SIGNIFICANT CHANGE UP (ref 7–23)
CALCIUM SERPL-MCNC: 9.3 MG/DL — SIGNIFICANT CHANGE UP (ref 8.4–10.5)
CHLORIDE SERPL-SCNC: 100 MMOL/L — SIGNIFICANT CHANGE UP (ref 96–108)
CO2 SERPL-SCNC: 26 MMOL/L — SIGNIFICANT CHANGE UP (ref 22–31)
COLOR SPEC: YELLOW — SIGNIFICANT CHANGE UP
CREAT SERPL-MCNC: 0.79 MG/DL — SIGNIFICANT CHANGE UP (ref 0.5–1.3)
DIFF PNL FLD: NEGATIVE — SIGNIFICANT CHANGE UP
EGFR: 102 ML/MIN/1.73M2 — SIGNIFICANT CHANGE UP
EGFR: 102 ML/MIN/1.73M2 — SIGNIFICANT CHANGE UP
EOSINOPHIL # BLD AUTO: 0.09 K/UL — SIGNIFICANT CHANGE UP (ref 0–0.5)
EOSINOPHIL NFR BLD AUTO: 1.2 % — SIGNIFICANT CHANGE UP (ref 0–6)
EPI CELLS # UR: PRESENT
GLUCOSE SERPL-MCNC: 91 MG/DL — SIGNIFICANT CHANGE UP (ref 70–99)
GLUCOSE UR QL: NEGATIVE MG/DL — SIGNIFICANT CHANGE UP
HCG UR QL: NEGATIVE — SIGNIFICANT CHANGE UP
HCT VFR BLD CALC: 40.3 % — SIGNIFICANT CHANGE UP (ref 34.5–45)
HGB BLD-MCNC: 12.8 G/DL — SIGNIFICANT CHANGE UP (ref 11.5–15.5)
IMM GRANULOCYTES # BLD AUTO: 0 K/UL — SIGNIFICANT CHANGE UP (ref 0–0.07)
IMM GRANULOCYTES NFR BLD AUTO: 0 % — SIGNIFICANT CHANGE UP (ref 0–0.9)
KETONES UR QL: ABNORMAL MG/DL
LEUKOCYTE ESTERASE UR-ACNC: ABNORMAL
LIDOCAIN IGE QN: 49 U/L — SIGNIFICANT CHANGE UP (ref 16–77)
LYMPHOCYTES # BLD AUTO: 2.58 K/UL — SIGNIFICANT CHANGE UP (ref 1–3.3)
LYMPHOCYTES NFR BLD AUTO: 34.7 % — SIGNIFICANT CHANGE UP (ref 13–44)
MCHC RBC-ENTMCNC: 23.6 PG — LOW (ref 27–34)
MCHC RBC-ENTMCNC: 31.8 G/DL — LOW (ref 32–36)
MCV RBC AUTO: 74.4 FL — LOW (ref 80–100)
MONOCYTES # BLD AUTO: 0.37 K/UL — SIGNIFICANT CHANGE UP (ref 0–0.9)
MONOCYTES NFR BLD AUTO: 5 % — SIGNIFICANT CHANGE UP (ref 2–14)
NEUTROPHILS # BLD AUTO: 4.36 K/UL — SIGNIFICANT CHANGE UP (ref 1.8–7.4)
NEUTROPHILS NFR BLD AUTO: 58.6 % — SIGNIFICANT CHANGE UP (ref 43–77)
NITRITE UR-MCNC: NEGATIVE — SIGNIFICANT CHANGE UP
NRBC # BLD AUTO: 0 K/UL — SIGNIFICANT CHANGE UP (ref 0–0)
NRBC # FLD: 0 K/UL — SIGNIFICANT CHANGE UP (ref 0–0)
NRBC BLD AUTO-RTO: 0 /100 WBCS — SIGNIFICANT CHANGE UP (ref 0–0)
PH UR: 6.5 — SIGNIFICANT CHANGE UP (ref 5–8)
PLATELET # BLD AUTO: 244 K/UL — SIGNIFICANT CHANGE UP (ref 150–400)
PMV BLD: 10.2 FL — SIGNIFICANT CHANGE UP (ref 7–13)
POTASSIUM SERPL-MCNC: 3.7 MMOL/L — SIGNIFICANT CHANGE UP (ref 3.5–5.3)
POTASSIUM SERPL-SCNC: 3.7 MMOL/L — SIGNIFICANT CHANGE UP (ref 3.5–5.3)
PROT SERPL-MCNC: 8.3 G/DL — SIGNIFICANT CHANGE UP (ref 6–8.3)
PROT UR-MCNC: NEGATIVE MG/DL — SIGNIFICANT CHANGE UP
RBC # BLD: 5.42 M/UL — HIGH (ref 3.8–5.2)
RBC # FLD: 13.2 % — SIGNIFICANT CHANGE UP (ref 10.3–14.5)
RBC CASTS # UR COMP ASSIST: 0 /HPF — SIGNIFICANT CHANGE UP (ref 0–4)
SODIUM SERPL-SCNC: 138 MMOL/L — SIGNIFICANT CHANGE UP (ref 135–145)
SP GR SPEC: 1.02 — SIGNIFICANT CHANGE UP (ref 1–1.03)
UROBILINOGEN FLD QL: 1 MG/DL — SIGNIFICANT CHANGE UP (ref 0.2–1)
WBC # BLD: 7.44 K/UL — SIGNIFICANT CHANGE UP (ref 3.8–10.5)
WBC # FLD AUTO: 7.44 K/UL — SIGNIFICANT CHANGE UP (ref 3.8–10.5)
WBC UR QL: 6 /HPF — HIGH (ref 0–5)

## 2025-07-18 PROCEDURE — 80053 COMPREHEN METABOLIC PANEL: CPT

## 2025-07-18 PROCEDURE — 99284 EMERGENCY DEPT VISIT MOD MDM: CPT | Mod: 25

## 2025-07-18 PROCEDURE — 96361 HYDRATE IV INFUSION ADD-ON: CPT

## 2025-07-18 PROCEDURE — 74178 CT ABD&PLV WO CNTR FLWD CNTR: CPT

## 2025-07-18 PROCEDURE — 96360 HYDRATION IV INFUSION INIT: CPT | Mod: XU

## 2025-07-18 PROCEDURE — 36415 COLL VENOUS BLD VENIPUNCTURE: CPT

## 2025-07-18 PROCEDURE — 87086 URINE CULTURE/COLONY COUNT: CPT

## 2025-07-18 PROCEDURE — 99285 EMERGENCY DEPT VISIT HI MDM: CPT

## 2025-07-18 PROCEDURE — 81025 URINE PREGNANCY TEST: CPT

## 2025-07-18 PROCEDURE — 74178 CT ABD&PLV WO CNTR FLWD CNTR: CPT | Mod: 26

## 2025-07-18 PROCEDURE — 81001 URINALYSIS AUTO W/SCOPE: CPT

## 2025-07-18 PROCEDURE — 85025 COMPLETE CBC W/AUTO DIFF WBC: CPT

## 2025-07-18 PROCEDURE — 83690 ASSAY OF LIPASE: CPT

## 2025-07-18 RX ADMIN — Medication 1000 MILLILITER(S): at 22:46

## 2025-07-18 RX ADMIN — Medication 1000 MILLILITER(S): at 19:45

## 2025-07-18 NOTE — ED ADULT NURSE NOTE - NS ED NURSE RECORD ANOTHER VITAL SIGN
Physical Therapy Visit    Visit Type: Daily Treatment Note  Visit: 2  Referring Provider: June Montalvo MD  Medical Diagnosis (from order): Diagnosis Information    Diagnosis  S76.312A (ICD-10-CM) - Strain of left hamstring muscle, initial encounter         SUBJECTIVE                                                                                                               Patient verbally consented to allow observer present during session.    Notes she is a little sore today. Was walking and had to quickly walking across the street and felt pain. Did have some difficulty with getting into her daughters Jeep over the weekend as well. Notes improvement in using her left foot to help take her right shoe off (stepping onto shoe).  Notes she does feel like she is getting better overall.     Pain / Symptoms  - Pain rating (out of 10): Current: 6       OBJECTIVE                                                                                                                                       Treatment     Therapeutic Exercise  NuStep: seat: 4, upper extremity: 5, resistance: 4 x 10 minutes   Prone hamstring isometrics against mobilization belt, 3 x 15  Seated hamstring stretch 3 x 15 seconds bilateral     Standing mini hip extension 2 x 10 bilateral      Manual Therapy   soft tissue mobilization to left hamstring musculature     Skilled input: verbal instruction/cues, tactile instruction/cues and as detailed above    Writer verbally educated and received verbal consent for hand placement, positioning of patient, and techniques to be performed today from patient for therapist position for techniques as described above and how they are pertinent to the patient's plan of care.  Home Exercise Program  Instructed patient to continue hamstring exercises and stretches prescribed by emergency department   Introduction of walking program      ASSESSMENT                                                                                                             Introduced hamstring strengthening via prone isometrics, with patient having a fair tolerance. There was noted pain, but patient was able to complete all repetitions asked of her. There was a better response to standing hip extension, which is encouraging. Will continue to progress patient as tolerated towards achievement of therapy goals.   Education:   - Results of above outlined education: Verbalizes understanding and Demonstrates understanding    PLAN                                                                                                                           Suggestions for next session as indicated: Progress per plan of care, continue to progress hamstring engagement, manual therapy as indicated        Therapy procedure time and total treatment time can be found documented on the Time Entry flowsheet     Yes

## 2025-07-18 NOTE — ED ADULT NURSE REASSESSMENT NOTE - NS ED NURSE REASSESS COMMENT FT1
MD aware of all results. vs as charted. pt and visitor informed of same. d/c to visitor in NAD. ambulates unassisted. discussed d/c instructions and follow up care

## 2025-07-18 NOTE — ED ADULT NURSE NOTE - SUICIDE SCREENING QUESTION 2
Spoke with patient let her know she can come in for EKG and see [facility]. Patient verbalized understanding and scheduled. No

## 2025-07-18 NOTE — ED ADULT NURSE NOTE - OBJECTIVE STATEMENT
pt to ED today; sent from urgent care; reports abd pain - right side - since Sunday; also reports blood in her stools when she wiped. denies n/v/d at this time. instructed to remain NPO until further notice

## 2025-07-19 VITALS
OXYGEN SATURATION: 98 % | DIASTOLIC BLOOD PRESSURE: 60 MMHG | RESPIRATION RATE: 16 BRPM | TEMPERATURE: 99 F | HEART RATE: 77 BPM | SYSTOLIC BLOOD PRESSURE: 95 MMHG

## 2025-07-19 NOTE — ED PROVIDER NOTE - NSFOLLOWUPINSTRUCTIONS_ED_ALL_ED_FT
Please be sure to hydrate, maintain soft stool, and keep a simple diet.  Follow-up with your primary care doctor and GI doctor.  Return to the emergency room for persistent abdominal pain, rectal pain, bloody stool, vomiting, diarrhea, dizziness, dehydration, or any other concerns. Please be sure to hydrate, maintain soft stool, and keep a simple diet.  Follow-up with your primary care doctor and GI doctor.  Return to the emergency room for persistent abdominal pain, rectal pain, bloody stool, vomiting, diarrhea, dizziness, dehydration, or any other concerns.      Lower Gastrointestinal Bleeding       Lower gastrointestinal (GI) bleeding is the result of bleeding from the colon, the rectum, or the area of the opening of the buttocks (anal area). The colon is the last part of the digestive tract, where stool (feces) is formed. A person with lower GI bleeding may see blood in or on his or her stool. The blood may be bright red.    Lower GI bleeding often stops without treatment. Continued or heavy bleeding may be life-threatening and needs emergency treatment at a hospital.    What are the causes?  This condition may be caused by:    Diverticulosis. This condition causes pouches to form in your colon over time.  Diverticulitis. This is inflammation in areas where diverticulosis has occurred.  Inflammatory bowel disease (IBD), or inflammation of the colon.  Hemorrhoids, or swollen veins in the rectum.  Anal fissures, or painful tears in the anus. Tears are often caused by passing hard stools.  Cancer of the colon or rectum, or polyps of the colon or rectum. Polyps are noncancerous growths.  Coagulopathy. This is a disorder that makes it hard to form blood clots and causes easy bleeding.  Arteriovenous malformation. This is an abnormal weakening of a blood vessel where an artery and a vein come together.    What increases the risk?  The following factors may make you more likely to develop this condition:    Being older than age 60.  Regular use of aspirin or NSAIDs, such as ibuprofen.  Taking blood thinners (anticoagulants) or anti-platelet medicines.  Having a history of high-dose X-ray treatment (radiation therapy) of the colon.  Having recently had a colon polyp removed.  Heavy use of alcohol, or use of nicotine products.    What are the signs or symptoms?  Symptoms of this condition include:    Bright red blood or blood clots coming from your rectum.  Bloody stools.  Black or maroon-colored stools.  Pain or cramping in your abdomen.  Weakness or dizziness.  Racing heartbeat.    How is this diagnosed?  This condition may be diagnosed based on:    Your symptoms and medical history.  A physical exam. During the exam, your health care provider will check for signs of blood loss, such as low blood pressure and a fast pulse.  Tests or procedures. These may include:    Flexible sigmoidoscopy. In this procedure, a flexible tube with a camera on the end is used to examine your anus and the first part of your colon to look for the source of bleeding.  Colonoscopy. This is similar to a flexible sigmoidoscopy, but the camera can extend all the way to the uppermost part of your colon.  Blood tests to measure your red blood cell count and to check for coagulopathy.  Angiogram. For this test, X-rays of your colon are taken after a dye or radioactive substance is injected into your bloodstream. This may be done to look for a bleeding site.    How is this treated?  Treatment for this condition depends on the cause of your bleeding. Heavy or ongoing bleeding is treated at a hospital. Treatment may include:    Getting fluids through an IV inserted into one of your veins.  Getting blood through an IV (blood transfusion).  Stopping bleeding with high heat (coagulation), injections of certain medicines, or surgical clips. This can all be done during a colonoscopy.  Having a procedure that involves first doing an angiogram and then blocking blood flow to the bleeding site (embolization).  Stopping some of your regular medicines for a certain amount of time.  Having surgery to remove part of your colon. This may be needed if bleeding is severe and does not lessen after other treatment.    Follow these instructions at home:      Eating and drinking     Eat foods that are high in fiber. This will help keep your stools soft. These foods include whole grains, legumes, fruits, and vegetables. Eating 1–3 prunes a day works well for many people.  Drink enough fluid to keep your urine pale yellow.  Do not drink alcohol.        General instructions     Do not use any products that contain nicotine or tobacco, such as cigarettes, e-cigarettes, and chewing tobacco. If you need help quitting, ask your health care provider.  Take over-the-counter and prescription medicines only as told by your health care provider. You may need to avoid aspirin, NSAIDs, or other medicines that increase bleeding.  Return to your normal activities as told by your health care provider. Ask your health care provider what activities are safe for you.  Keep all follow-up visits as told by your health care provider. This is important.    Contact a health care provider if:  Your symptoms do not improve.  You feel nauseous, or you vomit.  You have pain in your abdomen.  You feel weak or dizzy.  You need help to stop smoking or drinking alcohol.    Get help right away if:  Your bleeding increases.  You have severe weakness or you faint.  You have sudden or severe cramps in your back or abdomen.  You vomit blood.  You pass large blood clots in your stool.  Any of your other symptoms get worse.    These symptoms may represent a serious problem that is an emergency. Do not wait to see if the symptoms will go away. Get medical help right away. Call your local emergency services (911 in the [place] Do not drive yourself to the hospital.    Summary  If you have lower gastrointestinal (GI) bleeding, you may see blood in or on your stool (feces). The blood may be bright red.  Take over-the-counter and prescription medicines only as told by your health care provider. You may need to avoid aspirin, NSAIDs, or other medicines that increase bleeding.  Keep all follow-up visits as told by your health care provider. This is important.  Get help right away if your symptoms get worse.    ADDITIONAL NOTES AND INSTRUCTIONS    Please follow up with your Primary MD in 24-48 hr.  Seek immediate medical care for any new/worsening signs or symptoms.

## 2025-07-19 NOTE — ED PROVIDER NOTE - NPI NUMBER (FOR S[employer] USE ONLY) :
No changes in wound. Film was easily lifted today with cleaning. Changed back to plurogel and Aquacel Ag. Wound edges were slightly red and macerated. Will continue to wrap weekly.   
[[id number]],[[id number]]

## 2025-07-19 NOTE — ED PROVIDER NOTE - CLINICAL SUMMARY MEDICAL DECISION MAKING FREE TEXT BOX
34-year-old female presents with right lower quadrant abdominal pain, dizziness, bloody stool.  Abdominal pain started 5 days ago and has now resolved.  Patient reports 3 episodes of pink-tinged stool when she wipes.  Today she reports feeling dizzy/lightheaded.  No vomiting/diarrhea/fever/rectal pain/dysuria/hematuria. No focal findings on exam.  Check labs, H&H, UA, pregnancy, stool occult blood, CT abdomen and pelvis with and without contrast, reassess

## 2025-07-19 NOTE — ED PROVIDER NOTE - PROGRESS NOTE DETAILS
Results of workup discussed with patient, copies of all reports given.  Patient states she feels well, requesting to drink water.  She is aware of liver lesions found on CT.  Advised to follow-up with her primary care doctor and GI. Aware of trace WBC on UA, she denies dysuria, hematuria, urgency, frequency. All questions answered and return precautions discussed.  Patient expressed understanding of discharge instructions.  Significant other at bedside to take patient home.

## 2025-07-19 NOTE — ED PROVIDER NOTE - CARE PROVIDER_API CALL
None
Kristen Duque)  Internal Medicine  [address] [place] [address]  Phone: [phone]  Fax: [phone]  Follow Up Time:     Baldomero Edwards)  Gastroenterology  [address] [place] [id number]  Phone: [phone]  Fax: [phone]  Follow Up Time:

## 2025-07-19 NOTE — ED PROVIDER NOTE - PATIENT PORTAL LINK FT
You can access the FollowMyHealth Patient Portal offered by [facility] by registering at the following website: [email or web address]. By joining [employer]’s FollowMyHealth portal, you will also be able to view your health information using other applications (apps) compatible with our system.

## 2025-07-19 NOTE — ED PROVIDER NOTE - DIFFERENTIAL DIAGNOSIS
DDx includes but not limited to hemorrhoids, anal fissure, diverticulitis, rectal varices, mesenteric ischemia, IBS, IBD, anemia, dehydration, appendicitis, menstruation Differential Diagnosis

## 2025-07-19 NOTE — ED PROVIDER NOTE - GASTROINTESTINAL, MLM
[Normal] : supple, no neck mass and thyroid not enlarged [Normal Neck Lymph Nodes] : normal neck lymph nodes  [Normal Supraclavicular Lymph Nodes] : normal supraclavicular lymph nodes [Normal Groin Lymph Nodes] : normal groin lymph nodes [Normal Axillary Lymph Nodes] : normal axillary lymph nodes [Normal] : oriented to person, place and time, with appropriate affect [de-identified] : 8mm superficial lesion right forehead at sight of BCC Abdomen soft, non-tender, non-distended, no rebound, no guarding. +BS, no CVA tenderness, no pulsatile mass.  On rectal exam, good tone. No bleeding noted. No anal fissures or tears, no external hemorrhoids, no palpable mass

## 2025-07-19 NOTE — ED PROVIDER NOTE - OBJECTIVE STATEMENT
34-year-old female presents with dizziness, abdominal pain, bloody stool.  Patient states that 5 days ago, she had right lower quadrant abdominal cramping.  She assumed that she was about to start her menstrual cycle, she took no medication for the pain.  She denies any associated nausea, vomiting, diarrhea, fever, dysuria, hematuria.  The abdominal pain has since resolved.  For the past 3 days, she has noted a scant amount of blood when she wipes after having a bowel movement.  She reports 1 episode daily for the past 3 days.  She denies any rectal pain, diarrhea, hemorrhoids.  She states that it is pink-tinged stool when wiping.  Today, patient reports feeling dizziness.  Denies syncope, chest pain, shortness of breath.  No history of anemia.  Patient states she has a history of alcohol abuse but stopped drinking and smoking 5 months ago.  She started Tirzepatide for weight loss 5 months ago, has made efforts to become healthier.  She has lost 50 pounds since starting the medication.  PMD Dunia Peterson

## 2025-07-20 LAB
CULTURE RESULTS: SIGNIFICANT CHANGE UP
SPECIMEN SOURCE: SIGNIFICANT CHANGE UP